# Patient Record
Sex: FEMALE | Race: WHITE | NOT HISPANIC OR LATINO | URBAN - METROPOLITAN AREA
[De-identification: names, ages, dates, MRNs, and addresses within clinical notes are randomized per-mention and may not be internally consistent; named-entity substitution may affect disease eponyms.]

---

## 2023-09-14 PROBLEM — H40.003 PREGLAUCOMA, UNSPECIFIED, BILATERAL: Status: ACTIVE | Noted: 2023-09-14

## 2023-09-14 PROBLEM — Z96.1 PRESENCE OF INTRAOCULAR LENS: Status: ACTIVE | Noted: 2023-09-14

## 2023-09-14 PROBLEM — H18.20 CORNEAL EDEMA: Status: ACTIVE | Noted: 2023-09-14

## 2023-09-14 PROBLEM — F41.9 ANXIETY: Status: ACTIVE | Noted: 2023-09-14

## 2023-09-14 PROBLEM — H25.10 NUCLEAR SENILE CATARACT: Status: ACTIVE | Noted: 2023-09-14

## 2023-09-14 PROBLEM — S76.011A STRAIN OF RIGHT HIP: Status: ACTIVE | Noted: 2023-09-14

## 2023-09-14 PROBLEM — H02.834 DERMATOCHALASIS OF LEFT UPPER EYELID: Status: ACTIVE | Noted: 2023-09-14

## 2023-09-14 PROBLEM — H52.7 UNSPECIFIED DISORDER OF REFRACTION: Status: ACTIVE | Noted: 2023-09-14

## 2023-09-14 PROBLEM — S86.019A: Status: ACTIVE | Noted: 2023-09-14

## 2023-09-14 PROBLEM — M79.606 PAIN OF LOWER EXTREMITY: Status: ACTIVE | Noted: 2023-09-14

## 2023-09-14 PROBLEM — H02.831 DERMATOCHALASIS OF RIGHT UPPER EYELID: Status: ACTIVE | Noted: 2023-09-14

## 2023-09-14 PROBLEM — H02.409 PTOSIS OF EYELID: Status: ACTIVE | Noted: 2023-09-14

## 2023-09-14 PROBLEM — H52.213 IRREGULAR ASTIGMATISM OF BOTH EYES: Status: ACTIVE | Noted: 2023-09-14

## 2023-09-14 RX ORDER — NAPROXEN SODIUM 220 MG/1
81 TABLET, FILM COATED ORAL DAILY
COMMUNITY
End: 2024-01-23 | Stop reason: WASHOUT

## 2024-01-10 ENCOUNTER — OFFICE VISIT (OUTPATIENT)
Dept: OPHTHALMOLOGY | Facility: CLINIC | Age: 79
End: 2024-01-10
Payer: COMMERCIAL

## 2024-01-10 DIAGNOSIS — H52.213 IRREGULAR ASTIGMATISM OF BOTH EYES: ICD-10-CM

## 2024-01-10 DIAGNOSIS — Z96.1 PRESENCE OF INTRAOCULAR LENS: ICD-10-CM

## 2024-01-10 DIAGNOSIS — H35.62 RETINAL HEMORRHAGE, LEFT EYE: Primary | ICD-10-CM

## 2024-01-10 PROBLEM — L60.8 SPLINTER HEMORRHAGE: Status: ACTIVE | Noted: 2024-01-10

## 2024-01-10 RX ORDER — DICYCLOMINE HYDROCHLORIDE 10 MG/1
10 CAPSULE ORAL AS NEEDED
COMMUNITY
Start: 2023-12-28

## 2024-01-10 ASSESSMENT — ENCOUNTER SYMPTOMS
LOSS OF SENSATION IN FEET: 0
DEPRESSION: 0
OCCASIONAL FEELINGS OF UNSTEADINESS: 0

## 2024-01-10 ASSESSMENT — REFRACTION_WEARINGRX
OS_ADD: 1.00
OD_ADD: 2.00
SPECS_TYPE: PAL
OD_SPHERE: +3.00
OS_AXIS: 176
OS_CYLINDER: -3.50
OD_CYLINDER: -3.25
OD_AXIS: 171
OS_SPHERE: +2.75

## 2024-01-10 ASSESSMENT — PATIENT HEALTH QUESTIONNAIRE - PHQ9
1. LITTLE INTEREST OR PLEASURE IN DOING THINGS: NOT AT ALL
SUM OF ALL RESPONSES TO PHQ9 QUESTIONS 1 AND 2: 0
2. FEELING DOWN, DEPRESSED OR HOPELESS: NOT AT ALL

## 2024-01-10 ASSESSMENT — VISUAL ACUITY
OS_CC: 20/40
OD_CC: 20/40
OS_CC+: -1
CORRECTION_TYPE: GLASSES
OD_CC+: +1
METHOD: SNELLEN - LINEAR

## 2024-01-10 ASSESSMENT — CUP TO DISC RATIO
OS_RATIO: 0.65
OD_RATIO: 0.55

## 2024-01-10 ASSESSMENT — TONOMETRY
IOP_METHOD: GOLDMANN APPLANATION
OD_IOP_MMHG: 08
OS_IOP_MMHG: 08

## 2024-01-10 ASSESSMENT — SLIT LAMP EXAM - LIDS
COMMENTS: 1+ BLEPHARITIS, 1+ DERMATOCHALASIS - UPPER LID
COMMENTS: 1+ BLEPHARITIS, 1+ DERMATOCHALASIS - UPPER LID

## 2024-01-10 ASSESSMENT — EXTERNAL EXAM - LEFT EYE: OS_EXAM: BROW PTOSIS

## 2024-01-10 ASSESSMENT — EXTERNAL EXAM - RIGHT EYE: OD_EXAM: BROW PTOSIS

## 2024-01-10 ASSESSMENT — PAIN SCALES - GENERAL: PAINLEVEL: 0-NO PAIN

## 2024-01-10 NOTE — PROGRESS NOTES
Subjective   Patient ID: Rajni Lala is a 78 y.o. female.    Chief Complaint    Follow-up       HPI    No visual acuity (VA) complaints   Last edited by SHANTEL Cooper on 1/10/2024  9:19 AM.        No current outpatient medications on file. (Ophthalmology pharm classes)       Current Outpatient Medications (Other)   Medication Sig Dispense Refill    aspirin 81 mg chewable tablet Chew 1 tablet (81 mg) once daily.      dicyclomine (Bentyl) 10 mg capsule Take 1 capsule (10 mg) by mouth once daily.      Lactobacillus acidophilus (PROBIOTIC ORAL) Take by mouth.         Objective   Base Eye Exam       Visual Acuity (Snellen - Linear)         Right Left Both    Dist cc 20/40 +1 20/40 -1 20/30    Dist ph cc NI NI     Near cc   J1+      Correction: Glasses              Tonometry (Goldmann Applanation, 9:26 AM)         Right Left    Pressure 08 08              Pupils         Dark Shape React APD    Right 4 Round Minimal None    Left 4 Round Minimal None              Extraocular Movement         Right Left     Full Full              Dilation       Both eyes: 1% Tropic 2.5% Phen @ 9:26 AM                  Slit Lamp and Fundus Exam       External Exam         Right Left    External Brow ptosis Brow ptosis              Slit Lamp Exam         Right Left    Lids/Lashes 1+ Blepharitis, 1+ Dermatochalasis - upper lid 1+ Blepharitis, 1+ Dermatochalasis - upper lid    Conjunctiva/Sclera normal bulbar and palepbral conjunctiva normal bulbar and palepbral conjunctiva    Cornea normal epi/stroma/endo and tear film normal epi/stroma/endo and tear film    Anterior Chamber ant. chamber deep and quiet ant. chamber deep and quiet    Iris iris normal iris normal    Lens PC IOL centered w/clear capsule PC IOL centered w/clear capsule    Anterior Vitreous Vitreous syneresis Vitreous syneresis              Fundus Exam         Right Left    Disc Normal Normal    C/D Ratio 0.55 0.65    Macula normal macula normal macula    Vessels normal  retinal vessels normal retinal vessels    Periphery normal retinal periphery normal retinal periphery; one splinter heme OS inferiorly, Laser scar                        Refraction       Wearing Rx         Sphere Cylinder Axis Add    Right +3.00 -3.25 171 2.00    Left +2.75 -3.50 176 1.00      Type: PAL                    Assessment/Plan   Problem List Items Addressed This Visit          Eye/Vision problems    Irregular astigmatism of both eyes    Presence of intraocular lens    Retinal hemorrhage, left eye - Primary

## 2024-01-23 ENCOUNTER — OFFICE VISIT (OUTPATIENT)
Dept: PRIMARY CARE | Facility: CLINIC | Age: 79
End: 2024-01-23
Payer: COMMERCIAL

## 2024-01-23 VITALS
DIASTOLIC BLOOD PRESSURE: 70 MMHG | BODY MASS INDEX: 24.66 KG/M2 | HEART RATE: 93 BPM | HEIGHT: 65 IN | WEIGHT: 148 LBS | SYSTOLIC BLOOD PRESSURE: 142 MMHG | TEMPERATURE: 98.3 F | OXYGEN SATURATION: 99 %

## 2024-01-23 DIAGNOSIS — R03.0 ELEVATED BLOOD PRESSURE READING IN OFFICE WITHOUT DIAGNOSIS OF HYPERTENSION: Primary | ICD-10-CM

## 2024-01-23 PROBLEM — Z98.41 HISTORY OF RIGHT CATARACT EXTRACTION: Status: ACTIVE | Noted: 2024-01-23

## 2024-01-23 PROBLEM — Z98.42 HISTORY OF LEFT CATARACT EXTRACTION: Status: ACTIVE | Noted: 2024-01-23

## 2024-01-23 PROCEDURE — 1159F MED LIST DOCD IN RCRD: CPT | Performed by: FAMILY MEDICINE

## 2024-01-23 PROCEDURE — 1036F TOBACCO NON-USER: CPT | Performed by: FAMILY MEDICINE

## 2024-01-23 PROCEDURE — 1126F AMNT PAIN NOTED NONE PRSNT: CPT | Performed by: FAMILY MEDICINE

## 2024-01-23 PROCEDURE — 99214 OFFICE O/P EST MOD 30 MIN: CPT | Performed by: FAMILY MEDICINE

## 2024-01-23 RX ORDER — LISINOPRIL 10 MG/1
10 TABLET ORAL DAILY
Qty: 30 TABLET | Refills: 1 | Status: SHIPPED | OUTPATIENT
Start: 2024-01-23 | End: 2024-02-05 | Stop reason: SDUPTHER

## 2024-01-23 RX ORDER — ASCORBIC ACID 500 MG
1000 TABLET ORAL DAILY
COMMUNITY

## 2024-01-23 ASSESSMENT — PAIN SCALES - GENERAL: PAINLEVEL: 0-NO PAIN

## 2024-01-23 NOTE — PROGRESS NOTES
"       Outpatient Visit Note    Chief Complaint   Patient presents with    Hypertension     Pt had a friend recently pass away and has been anxious.         HPI:  Rajni Lala is a 78 y.o. female   who presents to the office secondary to concerns of elevated blood pressure.  She was last seen in the office on 6/12/2023 as a new patient for an annual wellness exam.           Review of chart notes past medical history significant for glaucoma/cataracts to which he is actively established in following with Dr. Delgadillo of Ophthalmology. She underwent left cataract removal in May with right cataract removal performed on 06/08/2023.           Prior to last encounter, patient had just subsequently presented to the [facility] Emergency Department approximately 24 hours prior secondary to complaints of intermittent abdominal pain for the past several weeks.  Stated to be rather anxious about her discomfort and reiterated having recently had cataract surgery to which her stomach had been \"acting up\" since.  Had been actively working on things such in the yard when she first noticed the symptoms.  Continued to note discomfort in her left upper quadrant abdominal discomfort which radiated to her entire body at times of both activity and inactivity. Did confirm history of prior cholecystectomy. Physical exam noted mild tenderness in epigastric and left upper quadrant with no rigidity, palpable masses or distension.  ER vitals noted elevated blood pressure at 163/77. Workup was completed including EKG, CT abdomen pelvis, CBC, urinalysis, CMP and lipase. Blood work was unremarkable with urinalysis negative for UTI. CT abdomen/pelvis showed subtle findings that could suggest enteritis/colitis but node definitive acute process. She was treated with dose of IV Protonix with symptoms improving over time. Overall, etiology remained unclear with patient ultimately discharged home with prescription of Bentyl and recommendation for " formal outpatient Gastroenterology follow-up.  At time of appointment she stated to be doing generally well with no active abdominal pain complaints.  Confirmed active relationship with Dr. Kumar, who performed her colonoscopy in 2020/2021 with plans for specialist follow-up if symptoms continued.    Today she reports significant stressors over the last several weeks including extensive caretaking with her neighbor/sister and recent death of her friend.  Does state that anxiety level has been increased.  With this she has been monitoring her blood pressures noting fluctuating readings between 140s-160s/80s-100s.  Denies any chest pain, shortness of breath, lightheadedness or dizziness.  Does recognize that her stress has added to higher blood pressure readings.    Of note, she did follow-up with GI and was given prescription for Bentyl.  Has used the medication intermittently with occasional episodes of abdominal discomfort.    Current Medications  Current Outpatient Medications   Medication Instructions    ascorbic acid (VITAMIN C) 1,000 mg, oral, Daily    aspirin 81 mg, oral, Daily    B complex-vitamin C-folic acid (Nephro-[facility] Rx) 1- mg-mg-mcg tablet 1 tablet, oral, Daily with breakfast, BIOTIN<BR>    calcium carbonate/vitamin D3 (CALCIUM 500 + D, D3, ORAL) oral    dicyclomine (BENTYL) 10 mg, oral, As needed    Lactobacillus acidophilus (PROBIOTIC ORAL) oral    lisinopril 10 mg, oral, Daily    vit C/vit D3/E/zinc/elderberry (AIRBORNE VITS ZINC ELDERBERRY ORAL) oral        Allergies  Allergies   Allergen Reactions    Erythromycin Unknown    Penicillins Unknown    Tetracycline Unknown        Past Medical History:   Diagnosis Date    Cataract     Glaucoma       Past Surgical History:   Procedure Laterality Date    CATARACT EXTRACTION Left 06/2023     Family History   Problem Relation Name Age of Onset    Pancreatic cancer Mother      Heart disease Father       Social History     Tobacco Use    Smoking  status: Never    Smokeless tobacco: Never   Vaping Use    Vaping Use: Never used   Substance Use Topics    Alcohol use: Not Currently    Drug use: Never       ROS  All pertinent positive symptoms are included in the history of present illness.  All other systems have been reviewed and are negative and noncontributory to this patient's current ailments.    VITAL SIGNS  Vitals:    01/23/24 1021   BP: 142/70   Pulse: 93   Temp: 36.8 °C (98.3 °F)   SpO2: 99%       PHYSICAL EXAM  GENERAL APPEARANCE: alert and oriented, Pleasant and cooperative, No Acute Distress  HEENT: EOMI, PERRLA, MMM  HEART: RRR, normal S1S2, no murmurs, click or rubs  LUNGS: clear to auscultation bilaterally, no wheezes/rhonchi/rales  EXTREMITIES: no edema, normal ROM  SKIN: normal, no rash, unremarkable  NEUROLOGIC EXAM: non-focal exam  MUSCULOSKELETAL: no gross abnormalities  PSYCH: affect is normal, eye contact is good      Assessment/Plan   Problem List Items Addressed This Visit             ICD-10-CM    Elevated blood pressure reading in office without diagnosis of hypertension - Primary R03.0     - Elevated blood pressures most likely driven by underlying stress to which we will continue to conservative monitor along with continued healthy balanced diet and moderation of salt/caffeine/alcohol  -We did discuss potential trial of low-dose antihypertensive in the form of lisinopril 10 mg which I have printed a prescription.  If home blood pressure readings continue to be significantly elevated, can initiate trial with plans for follow-up in in 2 to 4 weeks to monitor effectiveness/tolerance  -Please do not hesitate to call office if there are any concerns or issues         Relevant Medications    lisinopril 10 mg tablet       Counseling:       Medication education:         Education:  The patient is counseled regarding potential side-effects of all new medications        Understanding:  Patient expressed understanding        Adherence:  No  barriers to adherence identified

## 2024-01-23 NOTE — PATIENT INSTRUCTIONS
Problem List Items Addressed This Visit             ICD-10-CM    Elevated blood pressure reading in office without diagnosis of hypertension - Primary R03.0     - Elevated blood pressures most likely driven by underlying stress to which we will continue to conservative monitor along with continued healthy balanced diet and moderation of salt/caffeine/alcohol  -We did discuss potential trial of low-dose antihypertensive in the form of lisinopril 10 mg which I have printed a prescription.  If home blood pressure readings continue to be significantly elevated, can initiate trial with plans for follow-up in in 2 to 4 weeks to monitor effectiveness/tolerance  -Please do not hesitate to call office if there are any concerns or issues         Relevant Medications    lisinopril 10 mg tablet       Counseling:       Medication education:         Education:  The patient is counseled regarding potential side-effects of all new medications        Understanding:  Patient expressed understanding        Adherence:  No barriers to adherence identified

## 2024-01-23 NOTE — ASSESSMENT & PLAN NOTE
- Elevated blood pressures most likely driven by underlying stress to which we will continue to conservative monitor along with continued healthy balanced diet and moderation of salt/caffeine/alcohol  -We did discuss potential trial of low-dose antihypertensive in the form of lisinopril 10 mg which I have printed a prescription.  If home blood pressure readings continue to be significantly elevated, can initiate trial with plans for follow-up in in 2 to 4 weeks to monitor effectiveness/tolerance  -Please do not hesitate to call office if there are any concerns or issues

## 2024-02-01 DIAGNOSIS — R03.0 ELEVATED BLOOD PRESSURE READING IN OFFICE WITHOUT DIAGNOSIS OF HYPERTENSION: ICD-10-CM

## 2024-02-06 RX ORDER — LISINOPRIL 10 MG/1
10 TABLET ORAL DAILY
Qty: 30 TABLET | Refills: 1 | Status: SHIPPED | OUTPATIENT
Start: 2024-02-06 | End: 2024-05-03

## 2024-04-15 ENCOUNTER — OFFICE VISIT (OUTPATIENT)
Dept: OPHTHALMOLOGY | Facility: CLINIC | Age: 79
End: 2024-04-15
Payer: COMMERCIAL

## 2024-04-15 DIAGNOSIS — H02.834 DERMATOCHALASIS OF BOTH UPPER EYELIDS: ICD-10-CM

## 2024-04-15 DIAGNOSIS — H52.213 IRREGULAR ASTIGMATISM OF BOTH EYES: ICD-10-CM

## 2024-04-15 DIAGNOSIS — H40.003 PREGLAUCOMA, UNSPECIFIED, BILATERAL: ICD-10-CM

## 2024-04-15 DIAGNOSIS — H02.403 PTOSIS OF BOTH EYELIDS: ICD-10-CM

## 2024-04-15 DIAGNOSIS — Z96.1 PRESENCE OF INTRAOCULAR LENS: Primary | ICD-10-CM

## 2024-04-15 DIAGNOSIS — H02.831 DERMATOCHALASIS OF BOTH UPPER EYELIDS: ICD-10-CM

## 2024-04-15 PROBLEM — Z98.42 HISTORY OF LEFT CATARACT EXTRACTION: Status: RESOLVED | Noted: 2024-01-23 | Resolved: 2024-04-15

## 2024-04-15 PROBLEM — Z98.41 HISTORY OF RIGHT CATARACT EXTRACTION: Status: RESOLVED | Noted: 2024-01-23 | Resolved: 2024-04-15

## 2024-04-15 PROCEDURE — 99214 OFFICE O/P EST MOD 30 MIN: CPT | Performed by: OPHTHALMOLOGY

## 2024-04-15 ASSESSMENT — REFRACTION_WEARINGRX
OD_ADD: 2.00
OD_CYLINDER: -3.25
SPECS_TYPE: PAL
OS_ADD: 1.00
OS_CYLINDER: -3.50
OD_SPHERE: +3.00
OD_AXIS: 171
OS_AXIS: 176
OS_SPHERE: +2.75

## 2024-04-15 ASSESSMENT — ENCOUNTER SYMPTOMS
CONSTITUTIONAL NEGATIVE: 0
HEMATOLOGIC/LYMPHATIC NEGATIVE: 0
LOSS OF SENSATION IN FEET: 0
CARDIOVASCULAR NEGATIVE: 0
EYES NEGATIVE: 0
PSYCHIATRIC NEGATIVE: 0
NEUROLOGICAL NEGATIVE: 0
ALLERGIC/IMMUNOLOGIC NEGATIVE: 0
ENDOCRINE NEGATIVE: 0
MUSCULOSKELETAL NEGATIVE: 0
OCCASIONAL FEELINGS OF UNSTEADINESS: 0
RESPIRATORY NEGATIVE: 0
GASTROINTESTINAL NEGATIVE: 0
DEPRESSION: 0

## 2024-04-15 ASSESSMENT — VISUAL ACUITY
OS_CC+: +2
METHOD: SNELLEN - LINEAR
OS_CC: 20/40
OD_CC: 20/30
OD_CC+: -2
CORRECTION_TYPE: GLASSES

## 2024-04-15 ASSESSMENT — PATIENT HEALTH QUESTIONNAIRE - PHQ9
1. LITTLE INTEREST OR PLEASURE IN DOING THINGS: NOT AT ALL
2. FEELING DOWN, DEPRESSED OR HOPELESS: NOT AT ALL
SUM OF ALL RESPONSES TO PHQ9 QUESTIONS 1 AND 2: 0

## 2024-04-15 ASSESSMENT — EXTERNAL EXAM - RIGHT EYE: OD_EXAM: BROW PTOSIS

## 2024-04-15 ASSESSMENT — TONOMETRY
OD_IOP_MMHG: 10
OS_IOP_MMHG: 10
IOP_METHOD: GOLDMANN APPLANATION

## 2024-04-15 ASSESSMENT — PAIN SCALES - GENERAL: PAINLEVEL: 0-NO PAIN

## 2024-04-15 ASSESSMENT — CUP TO DISC RATIO
OS_RATIO: 0.65
OD_RATIO: 0.55

## 2024-04-15 ASSESSMENT — EXTERNAL EXAM - LEFT EYE: OS_EXAM: BROW PTOSIS

## 2024-04-15 NOTE — PROGRESS NOTES
Subjective   Patient ID: Rajni Lala is a 78 y.o. female.    Chief Complaint    Follow-up       HPI    Dilated exam.  No new changes in health history.  Now on lisinopril.  Vision is stable.  No new complaints.  Not happy with cl's.    Last edited by Mustapha Delgadillo MD on 4/15/2024  3:51 PM.        No current outpatient medications on file. (Ophthalmology pharm classes)       Current Outpatient Medications (Other)   Medication Sig Dispense Refill    ascorbic acid (Vitamin C) 500 mg tablet Take 2 tablets (1,000 mg) by mouth once daily.      B complex-vitamin C-folic acid (Nephro-Allen Rx) 1- mg-mg-mcg tablet Take 1 tablet by mouth once daily with breakfast. BIOTIN      calcium carbonate/vitamin D3 (CALCIUM 500 + D, D3, ORAL) Take by mouth.      dicyclomine (Bentyl) 10 mg capsule Take 1 capsule (10 mg) by mouth if needed.      vit C/vit D3/E/zinc/elderberry (AIRBORNE VITS ZINC ELDERBERRY ORAL) Take by mouth.      lisinopril 10 mg tablet Take 1 tablet (10 mg) by mouth once daily. 30 tablet 1       Objective   Base Eye Exam       Visual Acuity (Snellen - Linear)         Right Left    Dist cc 20/30 -2 20/40 +2      Correction: Glasses              Tonometry (Goldmann Applanation, 2:54 PM)         Right Left    Pressure 10 10              Pupils         Dark Shape React APD    Right 4 Round Minimal None    Left 4 Round Minimal None              Extraocular Movement         Right Left     Full Full              Dilation       Both eyes: 1% Tropic 2.5% Phen @ 2:54 PM                  Slit Lamp and Fundus Exam       External Exam         Right Left    External Brow ptosis Brow ptosis              Slit Lamp Exam         Right Left    Lids/Lashes 1+ Blepharitis, 1+ Dermatochalasis - upper lid 1+ Blepharitis, 1+ Dermatochalasis - upper lid    Conjunctiva/Sclera normal bulbar and palepbral conjunctiva normal bulbar and palepbral conjunctiva    Cornea normal epi/stroma/endo and tear film normal epi/stroma/endo and  tear film    Anterior Chamber ant. chamber deep and quiet ant. chamber deep and quiet    Iris iris normal iris normal    Lens PC IOL centered w/clear capsule PC IOL centered w/clear capsule    Anterior Vitreous Vitreous syneresis Vitreous syneresis              Fundus Exam         Right Left    Disc Normal Normal    C/D Ratio 0.55 0.65    Macula normal macula normal macula    Vessels normal retinal vessels normal retinal vessels    Periphery normal retinal periphery normal retinal periphery; one splinter heme OS inferiorly, Laser scar                        Refraction       Wearing Rx         Sphere Cylinder Axis Add    Right +3.00 -3.25 171 2.00    Left +2.75 -3.50 176 1.00      Type: PAL                    Assessment/Plan   Problem List Items Addressed This Visit          Eye/Vision problems    Dermatochalasis of both upper eyelids    Irregular astigmatism of both eyes    Preglaucoma, unspecified, bilateral     F/u one year full with oct nerves and cl exam.           Presence of intraocular lens - Primary    Ptosis of eyelid

## 2024-04-27 DIAGNOSIS — R03.0 ELEVATED BLOOD PRESSURE READING IN OFFICE WITHOUT DIAGNOSIS OF HYPERTENSION: ICD-10-CM

## 2024-05-03 RX ORDER — LISINOPRIL 10 MG/1
10 TABLET ORAL DAILY
Qty: 90 TABLET | Refills: 1 | Status: SHIPPED | OUTPATIENT
Start: 2024-05-03 | End: 2024-05-30 | Stop reason: SDUPTHER

## 2024-05-28 PROBLEM — M79.606 PAIN OF LOWER EXTREMITY: Status: RESOLVED | Noted: 2023-09-14 | Resolved: 2024-05-28

## 2024-05-28 PROBLEM — S76.011A STRAIN OF RIGHT HIP: Status: RESOLVED | Noted: 2023-09-14 | Resolved: 2024-05-28

## 2024-05-28 NOTE — PROGRESS NOTES
Outpatient Visit Note    Chief Complaint   Patient presents with    Earache     Right ear ache x 3 weeks. C/O Stuffiness, neck pain, HA, pressure          HPI:  Rajni Lala is a 78 y.o. female who presents to the office secondary to right ear pain.  She was last seen in the office on 1/23/2024 for concerns of elevated blood pressure, having initially been seen on 6/12/2023 as a new patient for an annual wellness exam.           Review of chart notes past medical history significant for glaucoma/cataracts to which he is actively established in following with Dr. Delgadillo of Ophthalmology. She underwent left cataract removal in May with right cataract removal performed on 06/08/2023.           Today she reports approximately 3 weeks of intermittent right-sided ear discomfort.  States to have history of sensitivity with her ears which seems to have been exacerbated following recent weather patterns.  Has noted associated postnasal drainage with sinus pressure, occipital/neck pain and nasal congestion.  Denies taking anything for symptom relief.  Does not traditionally have history of sinus infections or seasonal allergies.  No reports of fever, chills or other systemic complaints.  Denies any recent sick contacts    Current Medications  Current Outpatient Medications   Medication Instructions    ascorbic acid (VITAMIN C) 1,000 mg, oral, Daily    B complex-vitamin C-folic acid (Nephro-Allen Rx) 1- mg-mg-mcg tablet 1 tablet, oral, Daily with breakfast, BIOTIN<BR>    calcium carbonate/vitamin D3 (CALCIUM 500 + D, D3, ORAL) oral    clindamycin (CLEOCIN) 300 mg, oral, 3 times daily    dicyclomine (BENTYL) 10 mg, oral, As needed    fluticasone (Flonase) 50 mcg/actuation nasal spray 1 spray, Each Nostril, Daily, Shake gently. Before first use, prime pump. After use, clean tip and replace cap.    lisinopril 10 mg, oral, Daily        Allergies  Allergies   Allergen Reactions    Erythromycin Unknown     Penicillins Unknown    Tetracycline Unknown        Past Medical History:   Diagnosis Date    Cataract     Glaucoma       Past Surgical History:   Procedure Laterality Date    CATARACT EXTRACTION Left 06/2023    CHOLECYSTECTOMY       Family History   Problem Relation Name Age of Onset    Pancreatic cancer Mother      Heart disease Father Ric      Social History     Tobacco Use    Smoking status: Never     Passive exposure: Never    Smokeless tobacco: Never   Vaping Use    Vaping status: Never Used   Substance Use Topics    Alcohol use: Not Currently     Alcohol/week: 2.0 standard drinks of alcohol     Types: 1 Glasses of wine, 1 Shots of liquor per week    Drug use: Never       ROS  All pertinent positive symptoms are included in the history of present illness.  All other systems have been reviewed and are negative and noncontributory to this patient's current ailments.    VITAL SIGNS  Vitals:    05/30/24 1058   BP: 130/70   Pulse: 67   Resp: 18   Temp: 37 °C (98.6 °F)   SpO2: 96%         PHYSICAL EXAM  GENERAL APPEARANCE: alert and oriented, Pleasant and cooperative, No Acute Distress  HEENT: EOMI, PERRLA, mildly bulging TM with visible really exudate on right, no external auditory canal erythema, tympanic membranes clear and flat on left, nose clear, Oropharynx clear with MMM  EXTREMITIES: no edema, normal ROM  SKIN: normal, no rash, unremarkable  NEUROLOGIC EXAM: non-focal exam  MUSCULOSKELETAL: no gross abnormalities  PSYCH: affect is normal, eye contact is good      Assessment/Plan   Problem List Items Addressed This Visit             ICD-10-CM    Acute otitis media - Primary H66.90     - Symptoms and physical appear consistent with infection to which we will initiate clindamycin and attempts to avoid sensitivity to other medications  -Supportive care encouraged including good hydration and ibuprofen/Tylenol as needed for pain  -We will additionally add Flonase nasal spray to help open up eustachian  tube/sinus passages  -Please contact office if symptoms persist/progress or if there are any trouble with the medication         Relevant Medications    clindamycin (Cleocin) 300 mg capsule    fluticasone (Flonase) 50 mcg/actuation nasal spray         Counseling:       Medication education:         Education:  The patient is counseled regarding potential side-effects of all new medications        Understanding:  Patient expressed understanding        Adherence:  No barriers to adherence identified

## 2024-05-30 ENCOUNTER — OFFICE VISIT (OUTPATIENT)
Dept: PRIMARY CARE | Facility: CLINIC | Age: 79
End: 2024-05-30
Payer: COMMERCIAL

## 2024-05-30 VITALS
HEIGHT: 65 IN | RESPIRATION RATE: 18 BRPM | DIASTOLIC BLOOD PRESSURE: 70 MMHG | SYSTOLIC BLOOD PRESSURE: 130 MMHG | OXYGEN SATURATION: 96 % | WEIGHT: 148.6 LBS | BODY MASS INDEX: 24.76 KG/M2 | HEART RATE: 67 BPM | TEMPERATURE: 98.6 F

## 2024-05-30 DIAGNOSIS — H66.90 ACUTE OTITIS MEDIA, UNSPECIFIED OTITIS MEDIA TYPE: Primary | ICD-10-CM

## 2024-05-30 DIAGNOSIS — R03.0 ELEVATED BLOOD PRESSURE READING IN OFFICE WITHOUT DIAGNOSIS OF HYPERTENSION: ICD-10-CM

## 2024-05-30 PROCEDURE — 1160F RVW MEDS BY RX/DR IN RCRD: CPT | Performed by: FAMILY MEDICINE

## 2024-05-30 PROCEDURE — 1125F AMNT PAIN NOTED PAIN PRSNT: CPT | Performed by: FAMILY MEDICINE

## 2024-05-30 PROCEDURE — 1036F TOBACCO NON-USER: CPT | Performed by: FAMILY MEDICINE

## 2024-05-30 PROCEDURE — 99214 OFFICE O/P EST MOD 30 MIN: CPT | Performed by: FAMILY MEDICINE

## 2024-05-30 PROCEDURE — 1159F MED LIST DOCD IN RCRD: CPT | Performed by: FAMILY MEDICINE

## 2024-05-30 PROCEDURE — 1158F ADVNC CARE PLAN TLK DOCD: CPT | Performed by: FAMILY MEDICINE

## 2024-05-30 PROCEDURE — 1123F ACP DISCUSS/DSCN MKR DOCD: CPT | Performed by: FAMILY MEDICINE

## 2024-05-30 RX ORDER — FLUTICASONE PROPIONATE 50 MCG
1 SPRAY, SUSPENSION (ML) NASAL DAILY
Qty: 16 G | Refills: 1 | Status: SHIPPED | OUTPATIENT
Start: 2024-05-30 | End: 2025-05-30

## 2024-05-30 RX ORDER — CLINDAMYCIN HYDROCHLORIDE 300 MG/1
300 CAPSULE ORAL 3 TIMES DAILY
Qty: 21 CAPSULE | Refills: 0 | Status: SHIPPED | OUTPATIENT
Start: 2024-05-30 | End: 2024-06-06

## 2024-05-30 RX ORDER — LISINOPRIL 10 MG/1
10 TABLET ORAL DAILY
Qty: 90 TABLET | Refills: 1 | Status: SHIPPED | OUTPATIENT
Start: 2024-05-30 | End: 2024-11-26

## 2024-05-30 ASSESSMENT — LIFESTYLE VARIABLES
HOW OFTEN DO YOU HAVE A DRINK CONTAINING ALCOHOL: 2-4 TIMES A MONTH
HOW MANY STANDARD DRINKS CONTAINING ALCOHOL DO YOU HAVE ON A TYPICAL DAY: 1 OR 2
SKIP TO QUESTIONS 9-10: 1
HOW OFTEN DO YOU HAVE SIX OR MORE DRINKS ON ONE OCCASION: NEVER
AUDIT-C TOTAL SCORE: 2

## 2024-05-30 ASSESSMENT — PAIN SCALES - GENERAL: PAINLEVEL: 3

## 2024-05-30 NOTE — PATIENT INSTRUCTIONS
Problem List Items Addressed This Visit             ICD-10-CM    Acute otitis media - Primary H66.90     - Symptoms and physical appear consistent with infection to which we will initiate clindamycin and attempts to avoid sensitivity to other medications  -Supportive care encouraged including good hydration and ibuprofen/Tylenol as needed for pain  -We will additionally add Flonase nasal spray to help open up eustachian tube/sinus passages  -Please contact office if symptoms persist/progress or if there are any trouble with the medication         Relevant Medications    clindamycin (Cleocin) 300 mg capsule    fluticasone (Flonase) 50 mcg/actuation nasal spray         Counseling:       Medication education:         Education:  The patient is counseled regarding potential side-effects of all new medications        Understanding:  Patient expressed understanding        Adherence:  No barriers to adherence identified

## 2024-05-30 NOTE — ASSESSMENT & PLAN NOTE
- Symptoms and physical appear consistent with infection to which we will initiate clindamycin and attempts to avoid sensitivity to other medications  -Supportive care encouraged including good hydration and ibuprofen/Tylenol as needed for pain  -We will additionally add Flonase nasal spray to help open up eustachian tube/sinus passages  -Please contact office if symptoms persist/progress or if there are any trouble with the medication

## 2024-06-10 ENCOUNTER — E-VISIT (OUTPATIENT)
Dept: PRIMARY CARE | Facility: CLINIC | Age: 79
End: 2024-06-10
Payer: COMMERCIAL

## 2024-06-10 ENCOUNTER — TELEPHONE (OUTPATIENT)
Dept: PRIMARY CARE | Facility: CLINIC | Age: 79
End: 2024-06-10
Payer: COMMERCIAL

## 2024-06-10 DIAGNOSIS — H66.90 ACUTE OTITIS MEDIA, UNSPECIFIED OTITIS MEDIA TYPE: Primary | ICD-10-CM

## 2024-06-10 RX ORDER — METHYLPREDNISOLONE 4 MG/1
TABLET ORAL
Qty: 21 TABLET | Refills: 0 | Status: SHIPPED | OUTPATIENT
Start: 2024-06-10 | End: 2024-06-17

## 2024-06-10 NOTE — TELEPHONE ENCOUNTER
Patient calling because patient finished taking clindamycin on Thursday and her ear still isn't feeling right.  Please call & advise.  Thank you

## 2024-06-11 NOTE — TELEPHONE ENCOUNTER
Received message from patient yesterday via [employer] to which I did send steroid back to initiate.  Please refer to other message

## 2024-06-28 ENCOUNTER — CLINICAL SUPPORT (OUTPATIENT)
Dept: OPHTHALMOLOGY | Facility: CLINIC | Age: 79
End: 2024-06-28
Payer: COMMERCIAL

## 2024-06-28 ASSESSMENT — REFRACTION_CURRENTRX
OS_SPHERE: +2.00
OS_CYLINDER: -3.75
OD_BRAND: PROCLEAR TORIC XR
OD_DIAMETER: 14.4
OD_SPHERE: +5.75
OD_AXIS: 180
OS_BASECURVE: 8.4
OS_AXIS: 180
OS_BRAND: PROCLEAR TORIC XR
OS_DIAMETER: 14.4
OD_BASECURVE: 8.4
OD_CYLINDER: -3.75

## 2024-08-29 DIAGNOSIS — R03.0 ELEVATED BLOOD PRESSURE READING IN OFFICE WITHOUT DIAGNOSIS OF HYPERTENSION: ICD-10-CM

## 2024-08-29 RX ORDER — LISINOPRIL 10 MG/1
10 TABLET ORAL DAILY
Qty: 90 TABLET | Refills: 1 | Status: SHIPPED | OUTPATIENT
Start: 2024-08-29

## 2024-09-22 DIAGNOSIS — H66.90 ACUTE OTITIS MEDIA, UNSPECIFIED OTITIS MEDIA TYPE: ICD-10-CM

## 2024-09-23 RX ORDER — FLUTICASONE PROPIONATE 50 MCG
SPRAY, SUSPENSION (ML) NASAL
Qty: 16 G | Refills: 5 | Status: SHIPPED | OUTPATIENT
Start: 2024-09-23

## 2024-11-22 ENCOUNTER — PATIENT MESSAGE (OUTPATIENT)
Dept: PRIMARY CARE | Facility: CLINIC | Age: 79
End: 2024-11-22
Payer: COMMERCIAL

## 2024-11-22 DIAGNOSIS — R03.0 ELEVATED BLOOD PRESSURE READING IN OFFICE WITHOUT DIAGNOSIS OF HYPERTENSION: ICD-10-CM

## 2024-11-22 RX ORDER — LISINOPRIL 10 MG/1
10 TABLET ORAL DAILY
Qty: 90 TABLET | Refills: 1 | Status: SHIPPED | OUTPATIENT
Start: 2024-11-22

## 2024-12-04 ENCOUNTER — OFFICE VISIT (OUTPATIENT)
Dept: PRIMARY CARE | Facility: CLINIC | Age: 79
End: 2024-12-04
Payer: COMMERCIAL

## 2024-12-04 VITALS
SYSTOLIC BLOOD PRESSURE: 142 MMHG | WEIGHT: 145 LBS | BODY MASS INDEX: 24.16 KG/M2 | HEIGHT: 65 IN | DIASTOLIC BLOOD PRESSURE: 80 MMHG | HEART RATE: 102 BPM | TEMPERATURE: 97.5 F | OXYGEN SATURATION: 98 %

## 2024-12-04 DIAGNOSIS — Z00.00 ROUTINE ADULT HEALTH MAINTENANCE: Primary | ICD-10-CM

## 2024-12-04 DIAGNOSIS — I10 PRIMARY HYPERTENSION: ICD-10-CM

## 2024-12-04 PROBLEM — S86.019A: Status: RESOLVED | Noted: 2023-09-14 | Resolved: 2024-12-04

## 2024-12-04 PROBLEM — H40.003 PREGLAUCOMA, UNSPECIFIED, BILATERAL: Status: RESOLVED | Noted: 2023-09-14 | Resolved: 2024-12-04

## 2024-12-04 PROCEDURE — 3077F SYST BP >= 140 MM HG: CPT | Performed by: FAMILY MEDICINE

## 2024-12-04 PROCEDURE — 3079F DIAST BP 80-89 MM HG: CPT | Performed by: FAMILY MEDICINE

## 2024-12-04 PROCEDURE — 1159F MED LIST DOCD IN RCRD: CPT | Performed by: FAMILY MEDICINE

## 2024-12-04 PROCEDURE — 1160F RVW MEDS BY RX/DR IN RCRD: CPT | Performed by: FAMILY MEDICINE

## 2024-12-04 PROCEDURE — 1036F TOBACCO NON-USER: CPT | Performed by: FAMILY MEDICINE

## 2024-12-04 PROCEDURE — 1158F ADVNC CARE PLAN TLK DOCD: CPT | Performed by: FAMILY MEDICINE

## 2024-12-04 PROCEDURE — G0439 PPPS, SUBSEQ VISIT: HCPCS | Performed by: FAMILY MEDICINE

## 2024-12-04 PROCEDURE — 1126F AMNT PAIN NOTED NONE PRSNT: CPT | Performed by: FAMILY MEDICINE

## 2024-12-04 PROCEDURE — 1123F ACP DISCUSS/DSCN MKR DOCD: CPT | Performed by: FAMILY MEDICINE

## 2024-12-04 RX ORDER — VIT C/E/ZN/COPPR/LUTEIN/ZEAXAN 250MG-90MG
500 CAPSULE ORAL DAILY
COMMUNITY

## 2024-12-04 ASSESSMENT — PATIENT HEALTH QUESTIONNAIRE - PHQ9
SUM OF ALL RESPONSES TO PHQ9 QUESTIONS 1 AND 2: 0
2. FEELING DOWN, DEPRESSED OR HOPELESS: NOT AT ALL
1. LITTLE INTEREST OR PLEASURE IN DOING THINGS: NOT AT ALL

## 2024-12-04 ASSESSMENT — PAIN SCALES - GENERAL: PAINLEVEL_OUTOF10: 0-NO PAIN

## 2024-12-04 NOTE — ASSESSMENT & PLAN NOTE
- Pressures have remained stable on lisinopril 10 mg to which we will continue without modification  -Continue to focus on healthy, balanced diet with moderation of salt/caffeine

## 2024-12-04 NOTE — PROGRESS NOTES
Outpatient Visit Note    Chief Complaint   Patient presents with    Annual Exam       HPI:  Rajni Lala is a 79 y.o. female with a past medical history significant for glaucoma/cataracts to which he is actively established in following with Dr. Delgadillo of Ophthalmologywho presents to the office for an annual Medicare Wellness Visit.    She was last seen in the office in May 2024 secondary to right ear pain.  Prior to this she had been seen in the office on 1/23/2024 for concerns of elevated blood pressure secondary to recent stressors, to which she was started on lisinopril 10 mg.           Last panel blood work completed in June 2023 including CBC, CMP and lipase as performed via emergency department secondary to abdominal pain concerns.  Patient's blood work was generally unremarkable outside of trace hyperglycemia though sample was nonfasting.  Lipid panel was additionally completed which was within normal range.    Well Exam:  Overall, they describes their health as good with no reports of recent illness or hospitalization. No reported issues of chest pain, shortness of breath, headaches, vision/hearing changes, abdominal pain, vomiting, diarrhea, melena, hematochezia, constipation or urinary symptoms.    Preventative Health Maintenance:  In regards to preventative health maintenance, last Tdap received unknown. Flu shot not typically received. Pneumonia vaccination series not previously initiated per state records and historically declined. Shingles vaccination series not pursued to date. COVID-19 vaccination series completed with patient currently due for booster. Mammograms not traditionally pursued per patient's wishes. DEXA scan successfully completed in June 2023.    Has been compliant with lisinopril which has been generally tolerated outside of occasional nonproductive cough and slight nausea.  Has noted blood pressures to continue to fluctuate, mainly related to stress.  She has been  actively taking care of her sisters who have been dealing with prominent health issues.  Notes that this also causes aggravation to her irritable bowel symptoms.  In general she feels that she keeps the symptoms and check.  She does check blood pressures at home, regularly seeing readings with systolics in the 120s-130s.  Feels generally comfortable on current regimen with interest on continuing without modification    Advanced directives: Living will/POA    Hearing screen: reports no difficulty with hearing and passes finger rub test bilaterally    Does the patient use opioid medications: No  Name of medication: N/A  If yes, do they take this medicine appropriately: N/A    How does the patient rate their health status today: Good    Cognitive Screen:  AAAx3  to person, place and time: Yes  3 word recall: Apple, Car, Shoe - Immediate recall: Yes        - 5 minutes recall: Yes  Impression: No cognitive deficiency observed during screening or encounter today      Reviewed:   Past Medical History/Allergies:  Yes  Family History:  Yes  Social History:  Yes  Current Medications:  Yes  Vital Signs:  Yes  Advanced Directives:  discussed  Immunizations:  reviewed today  Home Safety:                    Up & Go test > 30 seconds?  No                   Home have rugs; lack grab bars in bathroom; lack handrail on stairs; have poor lighting?  No                   Hearing difficulties?  No  Geriatric Assessment                   ADL areas requiring assistance:  Does not need help with Dressing, Eating, Ambulating, Toileting, Grooming, Hygiene.                    IADL areas requiring assistance:  Does not need help with Shopping, Housework, Accounting, Transportation, Driving.   Medications: reviewed  Current supplements:  Reviewed and recorded.   Other providers: Reviewed and recorded - Current providers and suppliers: Dr. Jefferson; Dr. Delgadillo -ophthalmology      Past Medical History:   Diagnosis Date    Cataract     Glaucoma          Current Medications  Current Outpatient Medications   Medication Instructions    ascorbic acid (VITAMIN C) 1,000 mg, Daily    B complex-vitamin C-folic acid (Nephro-[facility] Rx) 1- mg-mg-mcg tablet 1 tablet, Daily with breakfast    calcium carbonate/vitamin D3 (CALCIUM 500 + D, D3, ORAL) Take by mouth.    cyanocobalamin (VITAMIN B-12) 500 mcg, Daily    dicyclomine (BENTYL) 10 mg, As needed    fluticasone (Flonase) 50 mcg/actuation nasal spray USE 1 SPRAY IN EACH NOSTRIL ONCE DAILY. SHAKE GENTLY. BEFORE FIRST USE, PRIME PUMP. AFTER USE, CLEAN TIP AND REPLACE*CAP    lisinopril 10 mg, oral, Daily        Allergies  Allergies   Allergen Reactions    Erythromycin Unknown    Penicillins Unknown    Tetracycline Unknown        Immunizations  Immunization History   Administered Date(s) Administered    Pfizer COVID-19 vaccine, 12 years and older, (30mcg/0.3mL) (Comirnaty) 10/06/2023    Pfizer COVID-19 vaccine, bivalent, age 12 years and older (30 mcg/0.3 mL) 09/20/2022    Pfizer Gray Cap SARS-CoV-2 04/19/2022    Pfizer Purple Cap SARS-CoV-2 02/05/2021, 03/03/2021, 10/01/2021        Past Surgical History:   Procedure Laterality Date    CATARACT EXTRACTION Left 06/2023    CHOLECYSTECTOMY       Family History   Problem Relation Name Age of Onset    Pancreatic cancer Mother      Heart disease Father Ric      Social History     Tobacco Use    Smoking status: Never     Passive exposure: Never    Smokeless tobacco: Never   Vaping Use    Vaping status: Never Used   Substance Use Topics    Alcohol use: Yes     Alcohol/week: 2.0 standard drinks of alcohol     Types: 1 Glasses of wine, 1 Shots of liquor per week    Drug use: Never     Tobacco Use: Low Risk  (12/4/2024)    Patient History     Smoking Tobacco Use: Never     Smokeless Tobacco Use: Never     Passive Exposure: Never        ROS  All pertinent positive symptoms are included in the history of present illness.  All other systems have been reviewed and are negative and  noncontributory to this patient's current ailments.    VITAL SIGNS  Vitals:    12/04/24 0945   BP: 142/80   Pulse: 102   Temp: 36.4 °C (97.5 °F)   SpO2: 98%     Vitals:    12/04/24 0945   Weight: 65.8 kg (145 lb)      Body mass index is 24.13 kg/m².     PHYSICAL EXAM  GENERAL APPEARANCE: well nourished, well developed, looks like stated age, in no acute distress, not ill or tired appearing, conversing well.   HEENT: no trauma, normocephalic. PERRLA and EOMI with normal external exam. TM's intact with no injection or effusion, no signs of infection. Nares patent, turbinates pink without discharge. Pharynx pink with no exudates or lesions, no enlarged tonsils.   NECK: no nodes, supple without rigidity, no neck mass was observed, no thyromegaly or thyroid nodules.   HEART: regular rate and rhythm, S1 and S2 heard with no murmurs or skipped beats  LUNGS: clear to auscultation bilaterally with no wheezes, crackles or rales.   ABDOMEN: no organomegaly, soft, nontender, nondistended, no guarding/rebound/rigidity.   EXTREMITIES: moving all extremities equally with no edema or deformities.   SKIN: normal skin color and pigmentation, normal skin turgor without rash, lesions, or nodules visualized.   NEUROLOGIC EXAM: CN II-XII grossly intact, normal gait, normal balance, 5/5 muscle strength, sensation grossly intact.   PSYCH: mood and affect appropriate; alert and oriented to time, place, person; no difficulty with speech or language.     Preventative Services reviewed with patient and copy printed for patient.    Assessment/Plan   Problem List Items Addressed This Visit             ICD-10-CM    Hypertension I10     - Pressures have remained stable on lisinopril 10 mg to which we will continue without modification  -Continue to focus on healthy, balanced diet with moderation of salt/caffeine         Relevant Orders    TSH with reflex to Free T4 if abnormal    Lipid Panel    Comprehensive Metabolic Panel    CBC     Other Visit  Diagnoses         Codes    Routine adult health maintenance    -  Primary Z00.00    Relevant Orders    TSH with reflex to Free T4 if abnormal    Lipid Panel    Comprehensive Metabolic Panel    CBC            Additional Visit Plans:  Notes:  PREVENTATIVE HEALTH SCREENINGS INCLUDED:  - Blood pressure screen.  - Blood work may include a cholesterol and diabetes screen if risk factors exist (overweight, high blood pressure etc); screening for sexually transmitted infections; a one time HIV screen for all individuals, and a one time Hepatitis C Virus screen for those born between 7784-2702.  - I encourage you to eat a low-fat, moderate-carbohydrate, low-calorie diet to maintain a normal BMI (under 25) to reduce heart disease, and risk for diabetes  - Moderate intensity exercise for 30 minutes 5 days per week is recommended  - Along with recommendations for nutrition and exercise discussed today helpful resources recommended by the American Academy of Family Practice can be found at www.familydoctor.org or www.choosemyplate.gov    - Colon cancer screening for all ages 45-75 or 85 years old periodically depending on results.  - Cervical cancer screening (pap test) in women between 21-65 years old, periodically depending on results.  - Mammogram screening for breast cancer in women starting at 40-50 years and every 1-2 years.  - For men and women who have a 30 pack year smoking history and currently smoke or have quit in the past 15 years, screening for lung cancer with a yearly low dose CT scan is recommended starting at age 55 until age 80 years  - For men only who have smoked 100+ cigarettes anytime in their lifetime, a one time ultrasound screening for for abdominal aortic aneurysms starting at age 65 until 75 years old  - Bone density screening (DEXA) for osteoporosis in women aged 65 years and older, once every two years if needed.    IMMUNIZATIONS:  - Flu shot annually.  - Tetanus booster every 10 years.  - Two  pneumococcal vaccinations after 65 years old.  - Shingles vaccine for those 50 years or older.     This was a shared decision making visit.    Next Wellness Exam Due  In 1 year from today    Counseling:       Medication education:         Education:  The patient is counseled regarding potential side-effects of all new medications        Understanding:  Patient expressed understanding        Adherence:  No barriers to adherence identified    ** Please excuse any errors in grammar or translation related to this dictation. Voice recognition software was utilized to prepare this document. **

## 2024-12-04 NOTE — PATIENT INSTRUCTIONS
Problem List Items Addressed This Visit             ICD-10-CM    Hypertension I10     - Pressures have remained stable on lisinopril 10 mg to which we will continue without modification  -Continue to focus on healthy, balanced diet with moderation of salt/caffeine         Relevant Orders    TSH with reflex to Free T4 if abnormal    Lipid Panel    Comprehensive Metabolic Panel    CBC     Other Visit Diagnoses         Codes    Routine adult health maintenance    -  Primary Z00.00    Relevant Orders    TSH with reflex to Free T4 if abnormal    Lipid Panel    Comprehensive Metabolic Panel    CBC            Additional Visit Plans:  Notes:  PREVENTATIVE HEALTH SCREENINGS INCLUDED:  - Blood pressure screen.  - Blood work may include a cholesterol and diabetes screen if risk factors exist (overweight, high blood pressure etc); screening for sexually transmitted infections; a one time HIV screen for all individuals, and a one time Hepatitis C Virus screen for those born between 0251-2637.  - I encourage you to eat a low-fat, moderate-carbohydrate, low-calorie diet to maintain a normal BMI (under 25) to reduce heart disease, and risk for diabetes  - Moderate intensity exercise for 30 minutes 5 days per week is recommended  - Along with recommendations for nutrition and exercise discussed today helpful resources recommended by the American Academy of Family Practice can be found at www.familydoctor.org or www.choosemyplate.gov    - Colon cancer screening for all ages 45-75 or 85 years old periodically depending on results.  - Cervical cancer screening (pap test) in women between 21-65 years old, periodically depending on results.  - Mammogram screening for breast cancer in women starting at 40-50 years and every 1-2 years.  - For men and women who have a 30 pack year smoking history and currently smoke or have quit in the past 15 years, screening for lung cancer with a yearly low dose CT scan is recommended starting at age 55  until age 80 years  - For men only who have smoked 100+ cigarettes anytime in their lifetime, a one time ultrasound screening for for abdominal aortic aneurysms starting at age 65 until 75 years old  - Bone density screening (DEXA) for osteoporosis in women aged 65 years and older, once every two years if needed.    IMMUNIZATIONS:  - Flu shot annually.  - Tetanus booster every 10 years.  - Two pneumococcal vaccinations after 65 years old.  - Shingles vaccine for those 50 years or older.     This was a shared decision making visit.    Next Wellness Exam Due  In 1 year from today    Counseling:       Medication education:         Education:  The patient is counseled regarding potential side-effects of all new medications        Understanding:  Patient expressed understanding        Adherence:  No barriers to adherence identified    ** Please excuse any errors in grammar or translation related to this dictation. Voice recognition software was utilized to prepare this document. **

## 2024-12-09 ENCOUNTER — LAB (OUTPATIENT)
Dept: LAB | Facility: LAB | Age: 79
End: 2024-12-09
Payer: COMMERCIAL

## 2024-12-09 ENCOUNTER — PATIENT MESSAGE (OUTPATIENT)
Dept: PRIMARY CARE | Facility: CLINIC | Age: 79
End: 2024-12-09

## 2024-12-09 DIAGNOSIS — Z00.00 ROUTINE ADULT HEALTH MAINTENANCE: ICD-10-CM

## 2024-12-09 DIAGNOSIS — I10 PRIMARY HYPERTENSION: ICD-10-CM

## 2024-12-09 LAB
ALBUMIN SERPL BCP-MCNC: 4.2 G/DL (ref 3.4–5)
ALP SERPL-CCNC: 50 U/L (ref 33–136)
ALT SERPL W P-5'-P-CCNC: 15 U/L (ref 7–45)
ANION GAP SERPL CALCULATED.3IONS-SCNC: 14 MMOL/L (ref 10–20)
AST SERPL W P-5'-P-CCNC: 20 U/L (ref 9–39)
BILIRUB SERPL-MCNC: 1.1 MG/DL (ref 0–1.2)
BUN SERPL-MCNC: 16 MG/DL (ref 6–23)
CALCIUM SERPL-MCNC: 9.1 MG/DL (ref 8.6–10.3)
CHLORIDE SERPL-SCNC: 105 MMOL/L (ref 98–107)
CHOLEST SERPL-MCNC: 203 MG/DL (ref 0–199)
CHOLEST/HDLC SERPL: 3.6 {RATIO}
CO2 SERPL-SCNC: 26 MMOL/L (ref 21–32)
CREAT SERPL-MCNC: 0.83 MG/DL (ref 0.5–1.05)
EGFRCR SERPLBLD CKD-EPI 2021: 72 ML/MIN/1.73M*2
ERYTHROCYTE [DISTWIDTH] IN BLOOD BY AUTOMATED COUNT: 12.3 % (ref 11.5–14.5)
GLUCOSE SERPL-MCNC: 83 MG/DL (ref 74–99)
HCT VFR BLD AUTO: 39.7 % (ref 36–46)
HDLC SERPL-MCNC: 56.8 MG/DL
HGB BLD-MCNC: 13.4 G/DL (ref 12–16)
LDLC SERPL CALC-MCNC: 130 MG/DL
MCH RBC QN AUTO: 32.1 PG (ref 26–34)
MCHC RBC AUTO-ENTMCNC: 33.8 G/DL (ref 32–36)
MCV RBC AUTO: 95 FL (ref 80–100)
NON HDL CHOLESTEROL: 146 MG/DL (ref 0–149)
NRBC BLD-RTO: 0 /100 WBCS (ref 0–0)
PLATELET # BLD AUTO: 357 X10*3/UL (ref 150–450)
POTASSIUM SERPL-SCNC: 4.5 MMOL/L (ref 3.5–5.3)
PROT SERPL-MCNC: 6.4 G/DL (ref 6.4–8.2)
RBC # BLD AUTO: 4.17 X10*6/UL (ref 4–5.2)
SODIUM SERPL-SCNC: 140 MMOL/L (ref 136–145)
TRIGL SERPL-MCNC: 80 MG/DL (ref 0–149)
TSH SERPL-ACNC: 1.28 MIU/L (ref 0.44–3.98)
VLDL: 16 MG/DL (ref 0–40)
WBC # BLD AUTO: 7.4 X10*3/UL (ref 4.4–11.3)

## 2024-12-09 PROCEDURE — 80053 COMPREHEN METABOLIC PANEL: CPT

## 2024-12-09 PROCEDURE — 85027 COMPLETE CBC AUTOMATED: CPT

## 2024-12-09 PROCEDURE — 80061 LIPID PANEL: CPT

## 2024-12-09 PROCEDURE — 36415 COLL VENOUS BLD VENIPUNCTURE: CPT

## 2024-12-09 PROCEDURE — 84443 ASSAY THYROID STIM HORMONE: CPT

## 2025-02-11 ENCOUNTER — PATIENT MESSAGE (OUTPATIENT)
Dept: PRIMARY CARE | Facility: CLINIC | Age: 80
End: 2025-02-11
Payer: COMMERCIAL

## 2025-02-11 DIAGNOSIS — M25.552 LEFT HIP PAIN: Primary | ICD-10-CM

## 2025-02-12 ENCOUNTER — HOSPITAL ENCOUNTER (OUTPATIENT)
Dept: RADIOLOGY | Facility: HOSPITAL | Age: 80
Discharge: HOME | End: 2025-02-12
Payer: COMMERCIAL

## 2025-02-12 DIAGNOSIS — M25.552 LEFT HIP PAIN: ICD-10-CM

## 2025-02-12 PROCEDURE — 73502 X-RAY EXAM HIP UNI 2-3 VIEWS: CPT | Mod: LT

## 2025-02-17 ENCOUNTER — PATIENT MESSAGE (OUTPATIENT)
Dept: PRIMARY CARE | Facility: CLINIC | Age: 80
End: 2025-02-17
Payer: COMMERCIAL

## 2025-02-17 DIAGNOSIS — M25.552 LEFT HIP PAIN: Primary | ICD-10-CM

## 2025-03-07 ENCOUNTER — EVALUATION (OUTPATIENT)
Dept: PHYSICAL THERAPY | Facility: CLINIC | Age: 80
End: 2025-03-07
Payer: COMMERCIAL

## 2025-03-07 DIAGNOSIS — M25.552 LEFT HIP PAIN: ICD-10-CM

## 2025-03-07 PROCEDURE — 97530 THERAPEUTIC ACTIVITIES: CPT | Mod: GP | Performed by: PHYSICAL THERAPIST

## 2025-03-07 PROCEDURE — 97161 PT EVAL LOW COMPLEX 20 MIN: CPT | Mod: GP | Performed by: PHYSICAL THERAPIST

## 2025-03-07 SDOH — ECONOMIC STABILITY: GENERAL: QUALITY OF LIFE: GOOD

## 2025-03-07 ASSESSMENT — ENCOUNTER SYMPTOMS
PAIN SCALE AT LOWEST: 0
OCCASIONAL FEELINGS OF UNSTEADINESS: 0
DEPRESSION: 0
PAIN LOCATION: L HIP
LOSS OF SENSATION IN FEET: 0
QUALITY: DISCOMFORT
PAIN SCALE: 0
PAIN SCALE AT HIGHEST: 6
QUALITY: DULL ACHE

## 2025-03-07 NOTE — PROGRESS NOTES
Physical Therapy Evaluation and Treatment     Patient Name: Rajni Lala  MRN: [id number]  Encounter date: 3/7/2025  Time Calculation  Start Time: 932  Stop Time: 1010  Time Calculation (min): 38 min  PT Evaluation Time Entry  PT Evaluation (Low) Time Entry: 25  PT Therapeutic Procedures Time Entry  Therapeutic Activity Time Entry: 13  Low complexity due to patient's clinical presentation being stable and uncomplicated by any significant comorbidities that may affect rehab tolerance and progression.     Visit # 1 of 7  Visits/Dates Authorized: NO AUTH / $6000 OOP not met / $30 COPAY / 75V pt/ot/st - 0 used / ds 3/6/25.   Dry Needling - Not covered   Insurance Type: Payor: KEITH / Plan: [employer]M HMP / Product Type: *No Product type* /     Current Problem:   Problem List Items Addressed This Visit    None  Visit Diagnoses         Codes    Left hip pain     M25.552    Relevant Orders    Follow Up In Physical Therapy          Precautions:  Precautions  Precautions Comment: None       Subjective    Subjective Evaluation    History of Present Illness  Mechanism of injury: Pt presents to therapy with L hip pain. She reports she was doing exercise class last summer and ended up sublux her hip it did go back and was fine. She developed L plantar fascia and than getting hit in the lower L leg by car door which increased L hip pain. She is very active as she walks, participates in exercise class and bowl. She is unable to do all these without pain. She wants to remain very active as she has always been very active and even did down hill skiing until last year.   She does not report any other hip problems in the past or any current/past back issues other than stiffness during prolonged standing.     Quality of life: good    Pain  Current pain ratin  At best pain ratin  At worst pain ratin  Location: L hip  Quality: discomfort and dull ache    Social Support  Lives in: condominium (2 story without problems with  stairs; double hand rails)  Lives with: alone    Diagnostic Tests  X-ray: normal (Mild arthritis)    Treatments  No previous or current treatments  Patient Goals  Patient goals for therapy: decreased pain, increased strength, increased motion and return to sport/leisure activities  Patient goal: Return to bowling and exercise class without discomfort.         Objective      Objective     Palpation   Left   Tenderness of the adductor brevis, adductor longus, adductor aaron and TFL.     Right Tenderness of the iliopsoas.     Additional Palpation Details  IT band tenderness     Tenderness     Right Hip   Tenderness in the ASIS.     Active Range of Motion   Left Hip   Normal active range of motion    Strength/Myotome Testing     Left Hip   Planes of Motion   Flexion: 5  Abduction: 5  External rotation: 5  Internal rotation: 4+    Ambulation     Ambulation: Stairs   Ascend stairs: independent  Pattern: reciprocal  Railings: without rails  Descend stairs: independent  Pattern: reciprocal  Railings: without rails    Observational Gait   Gait: within functional limits     General Comments     Hip Comments   Pelvic alignment: abnormal with R shorter than L due to posterior R pelvic rotation corrected with METS and gentle AP mobs of R pelvis        Outcome Measures:  Other Measures  Lower Extremity Funtional Score (LEFS): 70     Treatments:  Therapeutic Activity  Therapeutic Activity Performed: Yes  Therapeutic Activity 1: Review and education of current HEP  Therapeutic Activity 2: Educated on anatomy in relation to findings  Therapeutic Activity 3: Trial of pelvic correction via manual therapy    HEP / Access Codes:   Access Code: [email or web address]  URL: [email or web address]  Date: 03/07/2025  Prepared by: Mary Bush    Exercises  - Supine SI Joint Self-Correction  - 1 x daily - 7 x weekly - 2 sets - 5 reps - 3-5 seconds hold  - Hooklying Isometric Hip Abduction Adduction with Belt and Ball  - 1 x daily - 7 x weekly -  2 sets - 5 reps - 3-5 seconds hold    Assessment   Assessment & Plan     Assessment  Impairments: abnormal gait, abnormal or restricted ROM, activity intolerance, impaired physical strength and pain with function  Assessment details: Pt is a 79 y.o female presenting to therapy with L hip pain. Pt demonstrated tenderness along IT band and adductors of L LE along with reduced strength of rotators due to IT band discomfort. Pt also found to have pelvic asymmetry with R posterior rotation creating strain on L LE and imbalance during weight bearing tasks. Overall pt demonstrated tension along L musculature along with pelvic asymmetry creating lumbopelvic imbalance. At this time pt would benefit from skilled physical therapy in order to prevent further functional decline and return to recreational task.    Prognosis: good    Goals  Return to bowling and exercise class without discomfort.    Plan  Therapy options: will be seen for skilled physical therapy services  Planned modality interventions: TENS, electrical stimulation/Russian stimulation and traction  Other planned modality interventions: KT Tape/Dry Needling/Cupping  Planned therapy interventions: abdominal trunk stabilization, manual therapy, motor coordination training, balance/weight-bearing training, neuromuscular re-education, body mechanics training, postural training, fine motor coordination training, soft tissue mobilization, flexibility, spinal/joint mobilization, functional ROM exercises, strengthening, gait training, stretching, therapeutic activities, home exercise program, transfer training and joint mobilization  Frequency: 1x week  Duration in visits: 6  Duration in weeks: 7  Treatment plan discussed with: patient           Goals:   Active       PT Problem       PT Goal 1       Start:  03/07/25    Expected End:  05/06/25       Pt will be 100% IND with HEP in 6 weeks in order to maintain progress with therapy.   Pt will improve B LE strength to 5/5 in  6 weeks in order to improve transfers, ambulation and stair negotiation   Pt will reduce pain levels to no more than 0/10 in 6 weeks in order to improve ambulation, squats and transfers  Pt will demonstrate subjective improvement of ADLs and recreational activities through improved score of 80 on LEFS in 6 weeks for personal goals.  Pt will return to walking, bowling and exercise class without any problems, pain or deficits in 6 weeks. - PERSONAL GOAL

## 2025-03-11 ENCOUNTER — OFFICE VISIT (OUTPATIENT)
Dept: PRIMARY CARE | Facility: CLINIC | Age: 80
End: 2025-03-11
Payer: COMMERCIAL

## 2025-03-11 VITALS
OXYGEN SATURATION: 97 % | HEIGHT: 65 IN | SYSTOLIC BLOOD PRESSURE: 130 MMHG | WEIGHT: 149 LBS | DIASTOLIC BLOOD PRESSURE: 80 MMHG | HEART RATE: 74 BPM | BODY MASS INDEX: 24.83 KG/M2 | TEMPERATURE: 96.2 F

## 2025-03-11 DIAGNOSIS — F41.9 ANXIETY: ICD-10-CM

## 2025-03-11 DIAGNOSIS — I10 PRIMARY HYPERTENSION: Primary | ICD-10-CM

## 2025-03-11 DIAGNOSIS — R03.0 ELEVATED BLOOD PRESSURE READING IN OFFICE WITHOUT DIAGNOSIS OF HYPERTENSION: ICD-10-CM

## 2025-03-11 PROCEDURE — 1036F TOBACCO NON-USER: CPT | Performed by: FAMILY MEDICINE

## 2025-03-11 PROCEDURE — 1126F AMNT PAIN NOTED NONE PRSNT: CPT | Performed by: FAMILY MEDICINE

## 2025-03-11 PROCEDURE — 1160F RVW MEDS BY RX/DR IN RCRD: CPT | Performed by: FAMILY MEDICINE

## 2025-03-11 PROCEDURE — 3075F SYST BP GE 130 - 139MM HG: CPT | Performed by: FAMILY MEDICINE

## 2025-03-11 PROCEDURE — 1158F ADVNC CARE PLAN TLK DOCD: CPT | Performed by: FAMILY MEDICINE

## 2025-03-11 PROCEDURE — 3079F DIAST BP 80-89 MM HG: CPT | Performed by: FAMILY MEDICINE

## 2025-03-11 PROCEDURE — 1159F MED LIST DOCD IN RCRD: CPT | Performed by: FAMILY MEDICINE

## 2025-03-11 PROCEDURE — 1123F ACP DISCUSS/DSCN MKR DOCD: CPT | Performed by: FAMILY MEDICINE

## 2025-03-11 PROCEDURE — 99213 OFFICE O/P EST LOW 20 MIN: CPT | Performed by: FAMILY MEDICINE

## 2025-03-11 RX ORDER — LISINOPRIL 10 MG/1
10 TABLET ORAL DAILY
Qty: 90 TABLET | Refills: 3 | Status: SHIPPED | OUTPATIENT
Start: 2025-03-11

## 2025-03-11 ASSESSMENT — PAIN SCALES - GENERAL: PAINLEVEL_OUTOF10: 0-NO PAIN

## 2025-03-11 NOTE — PROGRESS NOTES
Outpatient Visit Note    Chief Complaint   Patient presents with    Follow-up         HPI:  Rajni Lala is a 79 y.o. female with a past medical history significant for glaucoma/cataracts to which he is actively established in following with Dr. Delgadillo of Ophthalmology who presents to the office for follow-up.  She was last seen in the office in December 2024 for an annual Medicare Wellness Visit.    Last panel blood work completed on 12/9/2024 including CBC, CMP, lipid panel and TSH.  Blood work was generally unremarkable outside of moderate hyperlipidemia.    Overall, she describes her health as good with no reports of recent illness or hospitalization. No reported issues of chest pain, shortness of breath, headaches, vision/hearing changes, abdominal pain, vomiting, diarrhea, melena, hematochezia, constipation or urinary symptoms.    Has been compliant with lisinopril which has been generally tolerated outside of occasional nonproductive cough and slight nausea.  Did have more prominent congestion with right-sided ear fullness over the last 2 to 3 weeks.  Symptoms have calm down recently but she still notes a fullness on the right side.  Was experiencing sinus pressure which has resolved.     Hypertension:  She does check blood pressures at home, regularly seeing readings with systolics in the 120s-130s.  Feels generally comfortable on current regimen with interest on continuing without modification    Separately, she has had some tightness in her left thigh to which she is undergoing physical therapy assessment.  Does plan to be more active with the warmer weather.    Preventative Health Maintenance:  In regards to preventative health maintenance, last Tdap received unknown. Flu shot not typically received. Pneumonia vaccination series not previously initiated per state records and historically declined. Shingles vaccination series not pursued to date. COVID-19 vaccination series completed with  patient currently due for booster. Mammograms not traditionally pursued per patient's wishes. DEXA scan successfully completed in June 2023.    Advanced directives: Living will/POA    Current Medications  Current Outpatient Medications   Medication Instructions    ascorbic acid (VITAMIN C) 500 mg, Daily    B complex-vitamin C-folic acid (Nephro[facility]) 1- mg-mg-mcg tablet 1 tablet, Daily with breakfast    calcium carbonate/vitamin D3 (CALCIUM 500 + D, D3, ORAL) Take by mouth.    cyanocobalamin (VITAMIN B-12) 500 mcg, Daily    lisinopril 10 mg, oral, Daily        Allergies  Allergies   Allergen Reactions    Erythromycin Unknown    Penicillins Unknown    Tetracycline Unknown        Past Medical History:   Diagnosis Date    Cataract     Glaucoma       Past Surgical History:   Procedure Laterality Date    CATARACT EXTRACTION Left 06/2023    CHOLECYSTECTOMY       Family History   Problem Relation Name Age of Onset    Pancreatic cancer Mother      Heart disease Father Ric      Social History     Tobacco Use    Smoking status: Never     Passive exposure: Never    Smokeless tobacco: Never   Vaping Use    Vaping status: Never Used   Substance Use Topics    Alcohol use: Yes     Alcohol/week: 2.0 standard drinks of alcohol     Types: 1 Glasses of wine, 1 Shots of liquor per week    Drug use: Never       ROS  All pertinent positive symptoms are included in the history of present illness.  All other systems have been reviewed and are negative and noncontributory to this patient's current ailments.    VITAL SIGNS  Vitals:    03/11/25 1123   BP: 130/80   Pulse: 74   Temp: 35.7 °C (96.2 °F)   SpO2: 97%       PHYSICAL EXAM  GENERAL APPEARANCE: alert and oriented, Pleasant and cooperative, No Acute Distress  HEENT: EOMI, PERRLA, mildly bulging right TM with visible transudate, no appreciated erythema, MMM  HEART: RRR, normal S1S2, no murmurs, click or rubs  LUNGS: clear to auscultation bilaterally, no  wheezes/rhonchi/rales  EXTREMITIES: no edema, normal ROM  SKIN: normal, no rash, unremarkable  NEUROLOGIC EXAM: non-focal exam  MUSCULOSKELETAL: no gross abnormalities  PSYCH: affect is normal, eye contact is good    Assessment/Plan   Problem List Items Addressed This Visit             ICD-10-CM    Anxiety F41.9     - Stable         Hypertension - Primary I10     - Pressures have remained stable on lisinopril 10 mg to which we will continue without modification  -Continue to focus on healthy, balanced diet with moderation of salt/caffeine         Relevant Medications    lisinopril 10 mg tablet     Other Visit Diagnoses         Codes    Elevated blood pressure reading in office without diagnosis of hypertension     R03.0    Relevant Medications    lisinopril 10 mg tablet            Counseling:       Medication education:         Education:  The patient is counseled regarding potential side-effects of all new medications        Understanding:  Patient expressed understanding        Adherence:  No barriers to adherence identified    ** Please excuse any errors in grammar or translation related to this dictation. Voice recognition software was utilized to prepare this document. **

## 2025-03-11 NOTE — PATIENT INSTRUCTIONS
Problem List Items Addressed This Visit             ICD-10-CM    Anxiety F41.9     - Stable         Hypertension - Primary I10     - Pressures have remained stable on lisinopril 10 mg to which we will continue without modification  -Continue to focus on healthy, balanced diet with moderation of salt/caffeine         Relevant Medications    lisinopril 10 mg tablet     Other Visit Diagnoses         Codes    Elevated blood pressure reading in office without diagnosis of hypertension     R03.0    Relevant Medications    lisinopril 10 mg tablet            Counseling:       Medication education:         Education:  The patient is counseled regarding potential side-effects of all new medications        Understanding:  Patient expressed understanding        Adherence:  No barriers to adherence identified    ** Please excuse any errors in grammar or translation related to this dictation. Voice recognition software was utilized to prepare this document. **

## 2025-03-13 ENCOUNTER — TREATMENT (OUTPATIENT)
Dept: PHYSICAL THERAPY | Facility: CLINIC | Age: 80
End: 2025-03-13
Payer: COMMERCIAL

## 2025-03-13 DIAGNOSIS — M25.552 LEFT HIP PAIN: ICD-10-CM

## 2025-03-13 PROCEDURE — 97110 THERAPEUTIC EXERCISES: CPT | Mod: GP,CQ

## 2025-03-13 PROCEDURE — 97140 MANUAL THERAPY 1/> REGIONS: CPT | Mod: GP,CQ

## 2025-03-13 NOTE — PROGRESS NOTES
Physical Therapy Treatment    Patient Name: Rajni Lala  MRN: [id number]  Today's Date: 3/13/2025  Time Calculation  Start Time: 0845  Stop Time: 0925  Time Calculation (min): 40 min  PT Therapeutic Procedures Time Entry  Manual Therapy Time Entry: 20  Therapeutic Exercise Time Entry: 20    Insurance:  Visit number: 2 of 7  Authorization info: 2025:  ANTH FED R - NO AUTH / $6000 OOP not met / $30 COPAY / 75V pt/ot/st - 0 used / ds 3/6/25.   Insurance Type: Payor: KEITH / Plan: [employer]M HMP / Product Type: *No Product type* /     Current Problem   1. Left hip pain  Follow Up In Physical Therapy          Subjective   General    Pt still has L lateral hip pain that prevents her from bowling and doing things she is accustomed to.  Precautions:   none  Pain    minial  Post Treatment Pain Level less    Objective   Tenderness along L proximal ITB  Pelvic assymetry ( R anterior rotation)  Treatments:  Therapeutic Exercise:   Nu-step x 5'     PPT   PPT with MIP   Bridges 2 x 10  Clam shells with L3 TB 2 x 10   Piriformis stretch in supine  Manual:   Rolling out of L ITB/glute  Cupping along L proximal ITB  MET to correct roytation    Assessment   Assessment:    Pt tolerated session fairly well. Tension alonf L lateral hip .    Plan:    Continue with POC    OP EDUCATION:   Updated HEP    Goals:   Active       PT Problem       PT Goal 1       Start:  03/07/25    Expected End:  05/06/25       Pt will be 100% IND with HEP in 6 weeks in order to maintain progress with therapy.   Pt will improve B LE strength to 5/5 in 6 weeks in order to improve transfers, ambulation and stair negotiation   Pt will reduce pain levels to no more than 0/10 in 6 weeks in order to improve ambulation, squats and transfers  Pt will demonstrate subjective improvement of ADLs and recreational activities through improved score of 80 on LEFS in 6 weeks for personal goals.  Pt will return to walking, bowling and exercise class without any problems, pain  or deficits in 6 weeks. - PERSONAL GOAL

## 2025-03-18 ENCOUNTER — TREATMENT (OUTPATIENT)
Dept: PHYSICAL THERAPY | Facility: CLINIC | Age: 80
End: 2025-03-18
Payer: COMMERCIAL

## 2025-03-18 DIAGNOSIS — M25.552 LEFT HIP PAIN: ICD-10-CM

## 2025-03-18 PROCEDURE — 97110 THERAPEUTIC EXERCISES: CPT | Mod: GP,CQ

## 2025-03-18 PROCEDURE — 97140 MANUAL THERAPY 1/> REGIONS: CPT | Mod: GP,CQ

## 2025-03-18 NOTE — PROGRESS NOTES
Physical Therapy Treatment    Patient Name: Rajni Lala  MRN: [id number]  Today's Date: 3/18/2025  Time Calculation  Start Time: 0840  Stop Time: 0920  Time Calculation (min): 40 min  PT Therapeutic Procedures Time Entry  Manual Therapy Time Entry: 10  Therapeutic Exercise Time Entry: 30    Insurance:  Visit number: 3 of 7  Authorization info: 2025:  ANTH FED R - NO AUTH / $6000 OOP not met / $30 COPAY / 75V pt/ot/st - 0 used / ds 3/6/25.   Dry Needling - Not covered //  Insurance Type: Payor: KEITH / Plan: [employer]M HMP / Product Type: *No Product type* /     Current Problem   1. Left hip pain  Follow Up In Physical Therapy          Subjective   General    Pt was sore after last session but pain went away. Went bowling last night  Precautions:     Pain    4  Post Treatment Pain Level 2    Objective   Tenderness along lateral upper thigh more posteriorly    Treatments:  Therapeutic Exercise:  Therapeutic Exercise:   Nu-step x 5'   STS 1 pad 2 x 15  Mini lunge with rotations (L)  Proximal knee    PPT   PPT with MIP  Bridges 2 x 10 with L3  band  Clam shells with L3 TB 2 x 10  Hip flexor stretch  Supine leg crossover stretch  Piriformis stretch in supine      Manual:   Rolling out of L ITB/glute  STM to L lateral /posterior thigh      Assessment   Assessment:    Pt had no pain along distal ITB like last session. Mild discomfort in proximal posteriolateral thigh.    Plan:    Continue with POC    OP EDUCATION:   Added mini lunges with rotation L    Goals:   Active       PT Problem       PT Goal 1       Start:  03/07/25    Expected End:  05/06/25       Pt will be 100% IND with HEP in 6 weeks in order to maintain progress with therapy.   Pt will improve B LE strength to 5/5 in 6 weeks in order to improve transfers, ambulation and stair negotiation   Pt will reduce pain levels to no more than 0/10 in 6 weeks in order to improve ambulation, squats and transfers  Pt will demonstrate subjective improvement of ADLs and  recreational activities through improved score of 80 on LEFS in 6 weeks for personal goals.  Pt will return to walking, bowling and exercise class without any problems, pain or deficits in 6 weeks. - PERSONAL GOAL

## 2025-03-25 ENCOUNTER — TREATMENT (OUTPATIENT)
Dept: PHYSICAL THERAPY | Facility: CLINIC | Age: 80
End: 2025-03-25
Payer: COMMERCIAL

## 2025-03-25 DIAGNOSIS — M25.552 LEFT HIP PAIN: ICD-10-CM

## 2025-03-25 PROCEDURE — 97110 THERAPEUTIC EXERCISES: CPT | Mod: CQ,GP

## 2025-03-25 NOTE — PROGRESS NOTES
Physical Therapy Treatment    Patient Name: Rajni Lala  MRN: [id number]  Today's Date: 3/25/2025  Time Calculation  Start Time: 0925  Stop Time: 1005  Time Calculation (min): 40 min  PT Therapeutic Procedures Time Entry  Therapeutic Exercise Time Entry: 40    Insurance:  Visit number: 4 of 7  Authorization info: 2025:  ANTH FED R - NO AUTH / $6000 OOP not met / $30 COPAY / 75V pt/ot/st - 0 used / ds 3/6/25.   Dry Needling - Not covered //  Insurance Type: Payor: KEITH / Plan: [employer] HMP / Product Type: *No Product type* /     Current Problem   1. Left hip pain  Follow Up In Physical Therapy          Subjective   General    Pt feels she is improving. Felt better bowling the other night.  Precautions:   None   Pain    1-2  Post Treatment Pain Level 1-2    Objective   Tenderness along L piriformis    Treatments:  Therapeutic Exercise:    Nu-step x 5'   STS 2 x 15  Mini lunge with rotations (L) onto BOSU x 10  Mini squats on BOSU x 10  Proximal knee with PTB x 15  Towel slides x 12  Mule kicks 2 x 10  PPT with MIP  Bridges 2 x 10 with L3  band  Clam shells with L3 TB 2 x 10  Hip flexor stretch  Supine leg crossover stretch  Piriformis stretch in supine      Assessment   Assessment:    Focused on proximal strengthening to reinforce pelvic control with functional tasks.    Plan:    Continue with POC    OP EDUCATION:   Added mule kicks and towel slides to HEP    Goals:   Active       PT Problem       PT Goal 1       Start:  03/07/25    Expected End:  05/06/25       Pt will be 100% IND with HEP in 6 weeks in order to maintain progress with therapy.   Pt will improve B LE strength to 5/5 in 6 weeks in order to improve transfers, ambulation and stair negotiation   Pt will reduce pain levels to no more than 0/10 in 6 weeks in order to improve ambulation, squats and transfers  Pt will demonstrate subjective improvement of ADLs and recreational activities through improved score of 80 on LEFS in 6 weeks for personal  goals.  Pt will return to walking, bowling and exercise class without any problems, pain or deficits in 6 weeks. - PERSONAL GOAL

## 2025-04-02 ENCOUNTER — APPOINTMENT (OUTPATIENT)
Dept: PHYSICAL THERAPY | Facility: CLINIC | Age: 80
End: 2025-04-02
Payer: COMMERCIAL

## 2025-04-02 DIAGNOSIS — M25.552 LEFT HIP PAIN: ICD-10-CM

## 2025-04-02 PROCEDURE — 97110 THERAPEUTIC EXERCISES: CPT | Mod: GP,CQ

## 2025-04-02 NOTE — PROGRESS NOTES
"Physical Therapy Treatment    Patient Name: Rajni Lala  MRN: [id number]  Today's Date: 4/2/2025  Time Calculation  Start Time: 0845  Stop Time: 0925  Time Calculation (min): 40 min  PT Therapeutic Procedures Time Entry  Manual Therapy Time Entry: 5  Therapeutic Exercise Time Entry: 35    Insurance:  Visit number: 5 of 7  Authorization info: 2025:  ANTH FED R - NO AUTH / $6000 OOP not met / $30 COPAY / 75V pt/ot/st - 0 used / ds 3/6/25.   Dry Needling - Not covered //  Insurance Type: Payor: KEITH / Plan: [employer]M HMP / Product Type: *No Product type* /     Current Problem   1. Left hip pain  Follow Up In Physical Therapy          Subjective   General    Pt still feels the inability to trust L LE with her bowling motion.  Precautions:   None  Pain    0  Post Treatment Pain Level 0    Objective   Tightness in L glute/piriformis    Treatments:  Therapeutic Exercise:   Bike x 3'   STS 2 x 15 with #5 DB  Split squat lunges 2 x 10  Lateral pushes into RSB with L LE x 10 with 5\" holds  SLS on tramp  3 x 20\"  PPT with MIP  Bridges 2 x 10 with L3  band  SL bridges on L 2 x 10  SLS on L on tramp 3 c 20\"  Step and holds onto tramp 2 x 10   Supine gluteal and piriformis strertch    Manual:   Piriformis release      DNP  Mini lunge with rotations (L) onto BOSU x 10  Mini squats on BOSU x 10  Proximal knee with PTB x 15  Towel slides x 12  Mule kicks 2 x 10        Assessment   Assessment:    Pt showed fair balance with SLS tasks    Plan:    Continue with POC    OP EDUCATION:   Added SL bridging  and gluteal stretching.    Goals:   Active       PT Problem       PT Goal 1       Start:  03/07/25    Expected End:  05/06/25       Pt will be 100% IND with HEP in 6 weeks in order to maintain progress with therapy.   Pt will improve B LE strength to 5/5 in 6 weeks in order to improve transfers, ambulation and stair negotiation   Pt will reduce pain levels to no more than 0/10 in 6 weeks in order to improve ambulation, squats and " transfers  Pt will demonstrate subjective improvement of ADLs and recreational activities through improved score of 80 on LEFS in 6 weeks for personal goals.  Pt will return to walking, bowling and exercise class without any problems, pain or deficits in 6 weeks. - PERSONAL GOAL

## 2025-04-09 ENCOUNTER — TREATMENT (OUTPATIENT)
Dept: PHYSICAL THERAPY | Facility: CLINIC | Age: 80
End: 2025-04-09
Payer: COMMERCIAL

## 2025-04-09 DIAGNOSIS — M25.552 LEFT HIP PAIN: ICD-10-CM

## 2025-04-09 PROCEDURE — 97110 THERAPEUTIC EXERCISES: CPT | Mod: GP,CQ

## 2025-04-09 NOTE — PROGRESS NOTES
"Physical Therapy Treatment    Patient Name: Rajni Lala  MRN: [id number]  Today's Date: 4/9/2025  Time Calculation  Start Time: 0925  Stop Time: 1005  Time Calculation (min): 40 min  PT Therapeutic Procedures Time Entry  Therapeutic Exercise Time Entry: 40    Insurance:  Visit number: 6 of 7  Authorization info: 2025:  ANTH FED R - NO AUTH / $6000 OOP not met / $30 COPAY / 75V pt/ot/st - 0 used / ds 3/6/25.   Dry Needling - Not covered //  Insurance Type: Payor: KEITH / Plan: [employer] HMP / Product Type: *No Product type* /     Current Problem   1. Left hip pain  Follow Up In Physical Therapy          Subjective   General    Pt reports that she is doing better. No issues with bowling last night.  Precautions:   None  Pain    0  Post Treatment Pain Level 0    Objective   Minimal L piriformis tightness with there ex.    Treatments:  Therapeutic Exercise:   Bike x 3'  STS 2 x 15 with #8 DB  Lunges  onto BOSU 2 x 10  Lateral pushes into RSB with L LE x 10 with 5\" holds  Mini squats on BOSU x 10  SLS on tramp  3 x 20\"  Step and holds onto tramp 2 x 10  PPT with MIP with L3 band 2 x 15  Bridges 2 x 10 with L3  band 2 x 15  SL bridges on L 2 x 10  S/L Hip ABD with bent knee x 10   Clam shells with L3 TB  2 x 10  Step and holds onto tramp 2 x 10  Supine gluteal and piriformis stretch      Assessment   Assessment:    Pt is progressing well. Able to withstand ADL's and bowling without issue.    Plan:    Reassess next session    OP EDUCATION:   Increased resistances with there ex.    Goals:   Active       PT Problem       PT Goal 1 (Progressing)       Start:  03/07/25    Expected End:  05/06/25       Pt will be 100% IND with HEP in 6 weeks in order to maintain progress with therapy.   Pt will improve B LE strength to 5/5 in 6 weeks in order to improve transfers, ambulation and stair negotiation   Pt will reduce pain levels to no more than 0/10 in 6 weeks in order to improve ambulation, squats and transfers  Pt will " demonstrate subjective improvement of ADLs and recreational activities through improved score of 80 on LEFS in 6 weeks for personal goals.  Pt will return to walking, bowling and exercise class without any problems, pain or deficits in 6 weeks. - PERSONAL GOAL

## 2025-04-16 ENCOUNTER — TREATMENT (OUTPATIENT)
Dept: PHYSICAL THERAPY | Facility: CLINIC | Age: 80
End: 2025-04-16
Payer: COMMERCIAL

## 2025-04-16 DIAGNOSIS — M25.552 LEFT HIP PAIN: ICD-10-CM

## 2025-04-16 PROCEDURE — 97530 THERAPEUTIC ACTIVITIES: CPT | Mod: GP | Performed by: PHYSICAL THERAPIST

## 2025-04-16 NOTE — PROGRESS NOTES
"Physical Therapy Progress Note/Treatment    Patient Name: Rajni Lala  MRN: [id number]  Today's Date: 4/16/2025  Time Calculation  Start Time: 0930  Stop Time: 1011  Time Calculation (min): 41 min  PT Therapeutic Procedures Time Entry  Therapeutic Activity Time Entry: 40    Insurance:  Visit number: 7 of 13  Authorization info: 2025:  ANTH FED R - NO AUTH / $6000 OOP not met / $30 COPAY / 75V pt/ot/st - 0 used / ds 3/6/25.   Dry Needling - Not covered //  Insurance Type: Payor: KEITH / Plan: [employer]M HMP / Product Type: *No Product type* /     Current Problem   1. Left hip pain  Follow Up In Physical Therapy          Subjective   General    Pt reports that she is doing better, she is able to do standard lunges but is unable to perform dynamic lunges. She has no problems with any daily activities. She is compliant with HEP. Most of the discomfort is along L proximal IT band/TFL. She is still participating in exercise class with everything being fine except SLR, donkey kicks and hip abd in knee bent position. Pt would like to continue with therapy.   Precautions:   None  Pain    0  Post Treatment Pain Level 0    Objective   Lunges WNL   SLS WNL slight drop during L compared to R     L LE MMT:  hip flexion 4+/5 noted reduction in core noted anterior pelvic tilt   Hip abduction 4+/5 noted compensation with trunk roll and hip flexor compensation     Leg length symmetrical  Noted slight anterior rotation of L compared to R       Treatments:  Therapeutic Assessment:   NuStep Warm Up.   Reassessment performed this date   Review of HEP      DNP   Bike x 3'  STS 2 x 15 with #8 DB  Lunges  onto BOSU 2 x 10  Lateral pushes into RSB with L LE x 10 with 5\" holds  Mini squats on BOSU x 10  SLS on tramp  3 x 20\"  Step and holds onto tramp 2 x 10  PPT with MIP with L3 band 2 x 15  Bridges 2 x 10 with L3  band 2 x 15  SL bridges on L 2 x 10  S/L Hip ABD with bent knee x 10   Clam shells with L3 TB  2 x 10  Step and holds onto " tramp 2 x 10  Supine gluteal and piriformis stretch      Assessment   Assessment:    Pt has progressed since the start of therapy able to perform all ADLs with minimal to no discomfort however higher level recreational tasks remain challenging with symptoms. Pt demonstrating continued L hip abd weakness and overall fatigue with compensation and reduced core strength noted with pelvic involvement. At this time pt and PT agree to extend POC for another 6 visits at once a week to focus on dynamic core and hip strengthening tasks.     Plan:    1x/week for 6 visits     OP EDUCATION:   Access Code [email or web address]     Goals:   Active       PT Problem       PT Goal 1 (Progressing)       Start:  03/07/25    Expected End:  05/06/25       Pt will be 100% IND with HEP in 6 weeks in order to maintain progress with therapy.   Pt will improve B LE strength to 5/5 in 6 weeks in order to improve transfers, ambulation and stair negotiation   Pt will reduce pain levels to no more than 0/10 in 6 weeks in order to improve ambulation, squats and transfers  Pt will demonstrate subjective improvement of ADLs and recreational activities through improved score of 80 on LEFS in 6 weeks for personal goals.  Pt will return to walking, bowling and exercise class without any problems, pain or deficits in 6 weeks. - PERSONAL GOAL

## 2025-04-22 ENCOUNTER — APPOINTMENT (OUTPATIENT)
Dept: OPHTHALMOLOGY | Facility: CLINIC | Age: 80
End: 2025-04-22
Payer: COMMERCIAL

## 2025-05-06 ENCOUNTER — TREATMENT (OUTPATIENT)
Dept: PHYSICAL THERAPY | Facility: CLINIC | Age: 80
End: 2025-05-06
Payer: COMMERCIAL

## 2025-05-06 DIAGNOSIS — M25.552 LEFT HIP PAIN: ICD-10-CM

## 2025-05-06 PROCEDURE — 97110 THERAPEUTIC EXERCISES: CPT | Mod: GP | Performed by: PHYSICAL THERAPIST

## 2025-05-06 NOTE — PROGRESS NOTES
Physical Therapy Discharge/Treatment    Patient Name: Rajni Lala  MRN: [id number]  Today's Date: 5/6/2025  Time Calculation  Start Time: 0815  Stop Time: 0845  Time Calculation (min): 30 min  PT Therapeutic Procedures Time Entry  Therapeutic Exercise Time Entry: 30    Insurance:  Visit number: 8 of 13  Authorization info: 2025:  ANTH FED R - NO AUTH / $6000 OOP not met / $30 COPAY / 75V pt/ot/st - 0 used / ds 3/6/25.   Dry Needling - Not covered //  Insurance Type: Payor: KEITH / Plan: [employer] HMP / Product Type: *No Product type* /     Current Problem   1. Left hip pain  Follow Up In Physical Therapy            Subjective   General    Pt reports she is doing really well and feels ready to be finished with physical therapy. She is back to bowling without any deficits.   Precautions:   None  Pain    0  Post Treatment Pain Level 0    Objective   Good control   Full ROM and strength     Treatments:  Therapeutic Exercise   NuStep Warm Up  Reverse BOSU squat 10x2  BOSU lunge 10x2 B fwd/lateral   Lateral steps @ squat position mint TB 3 laps   Standing firehydrant mint TB 10x3 B   SLR 10x3 B       Assessment   Assessment:    Pt entered therapy this date stating that she is ready to be done with therapy, PT in agreement, formal discharge at this time.     Plan:    Discharge     OP EDUCATION:   Access Code [email or web address]     Goals:   Resolved       PT Problem       PT Goal 1 (Met)       Start:  03/07/25    Expected End:  05/06/25    Resolved:  05/06/25    Pt will be 100% IND with HEP in 6 weeks in order to maintain progress with therapy.   Pt will improve B LE strength to 5/5 in 6 weeks in order to improve transfers, ambulation and stair negotiation   Pt will reduce pain levels to no more than 0/10 in 6 weeks in order to improve ambulation, squats and transfers  Pt will demonstrate subjective improvement of ADLs and recreational activities through improved score of 80 on LEFS in 6 weeks for personal goals.  Pt will return  to walking, bowling and exercise class without any problems, pain or deficits in 6 weeks. - PERSONAL GOAL

## 2025-05-14 ENCOUNTER — APPOINTMENT (OUTPATIENT)
Dept: PHYSICAL THERAPY | Facility: CLINIC | Age: 80
End: 2025-05-14
Payer: COMMERCIAL

## 2025-05-21 ENCOUNTER — APPOINTMENT (OUTPATIENT)
Dept: PHYSICAL THERAPY | Facility: CLINIC | Age: 80
End: 2025-05-21
Payer: COMMERCIAL

## 2025-05-28 ENCOUNTER — APPOINTMENT (OUTPATIENT)
Dept: PHYSICAL THERAPY | Facility: CLINIC | Age: 80
End: 2025-05-28
Payer: COMMERCIAL

## 2025-06-04 ENCOUNTER — APPOINTMENT (OUTPATIENT)
Dept: PHYSICAL THERAPY | Facility: CLINIC | Age: 80
End: 2025-06-04
Payer: COMMERCIAL

## 2025-06-11 ENCOUNTER — APPOINTMENT (OUTPATIENT)
Dept: PHYSICAL THERAPY | Facility: CLINIC | Age: 80
End: 2025-06-11
Payer: COMMERCIAL

## 2025-08-20 ENCOUNTER — OFFICE VISIT (OUTPATIENT)
Dept: PRIMARY CARE | Facility: CLINIC | Age: 80
End: 2025-08-20
Payer: COMMERCIAL

## 2025-08-20 VITALS
TEMPERATURE: 97.6 F | DIASTOLIC BLOOD PRESSURE: 80 MMHG | BODY MASS INDEX: 24.32 KG/M2 | HEART RATE: 100 BPM | HEIGHT: 65 IN | SYSTOLIC BLOOD PRESSURE: 124 MMHG | WEIGHT: 146 LBS | OXYGEN SATURATION: 98 %

## 2025-08-20 DIAGNOSIS — Z78.0 MENOPAUSE: ICD-10-CM

## 2025-08-20 DIAGNOSIS — Z11.59 NEED FOR HEPATITIS C SCREENING TEST: ICD-10-CM

## 2025-08-20 DIAGNOSIS — I10 PRIMARY HYPERTENSION: Primary | ICD-10-CM

## 2025-08-20 DIAGNOSIS — Z76.89 ENCOUNTER TO ESTABLISH CARE: ICD-10-CM

## 2025-08-20 PROCEDURE — 99213 OFFICE O/P EST LOW 20 MIN: CPT | Performed by: PHYSICIAN ASSISTANT

## 2025-08-20 PROCEDURE — 1126F AMNT PAIN NOTED NONE PRSNT: CPT | Performed by: PHYSICIAN ASSISTANT

## 2025-08-20 PROCEDURE — 1159F MED LIST DOCD IN RCRD: CPT | Performed by: PHYSICIAN ASSISTANT

## 2025-08-20 PROCEDURE — 3074F SYST BP LT 130 MM HG: CPT | Performed by: PHYSICIAN ASSISTANT

## 2025-08-20 PROCEDURE — 1036F TOBACCO NON-USER: CPT | Performed by: PHYSICIAN ASSISTANT

## 2025-08-20 PROCEDURE — 3079F DIAST BP 80-89 MM HG: CPT | Performed by: PHYSICIAN ASSISTANT

## 2025-08-20 ASSESSMENT — PAIN SCALES - GENERAL: PAINLEVEL_OUTOF10: 0-NO PAIN

## 2025-08-20 ASSESSMENT — ENCOUNTER SYMPTOMS
ALLERGIC/IMMUNOLOGIC NEGATIVE: 1
CARDIOVASCULAR NEGATIVE: 1
CONSTITUTIONAL NEGATIVE: 1
GASTROINTESTINAL NEGATIVE: 1
LOSS OF SENSATION IN FEET: 0
NEUROLOGICAL NEGATIVE: 1
EYES NEGATIVE: 1
DEPRESSION: 0
ENDOCRINE NEGATIVE: 1
HEMATOLOGIC/LYMPHATIC NEGATIVE: 1
PSYCHIATRIC NEGATIVE: 1
RESPIRATORY NEGATIVE: 1
MUSCULOSKELETAL NEGATIVE: 1
OCCASIONAL FEELINGS OF UNSTEADINESS: 0

## 2025-08-20 ASSESSMENT — COLUMBIA-SUICIDE SEVERITY RATING SCALE - C-SSRS
2. HAVE YOU ACTUALLY HAD ANY THOUGHTS OF KILLING YOURSELF?: NO
6. HAVE YOU EVER DONE ANYTHING, STARTED TO DO ANYTHING, OR PREPARED TO DO ANYTHING TO END YOUR LIFE?: NO
1. IN THE PAST MONTH, HAVE YOU WISHED YOU WERE DEAD OR WISHED YOU COULD GO TO SLEEP AND NOT WAKE UP?: NO

## 2025-08-23 LAB
ALBUMIN SERPL-MCNC: 4.2 G/DL (ref 3.6–5.1)
ALP SERPL-CCNC: 54 U/L (ref 37–153)
ALT SERPL-CCNC: 10 U/L (ref 6–29)
ANION GAP SERPL CALCULATED.4IONS-SCNC: 7 MMOL/L (CALC) (ref 7–17)
AST SERPL-CCNC: 17 U/L (ref 10–35)
BILIRUB SERPL-MCNC: 1.3 MG/DL (ref 0.2–1.2)
BUN SERPL-MCNC: 15 MG/DL (ref 7–25)
CALCIUM SERPL-MCNC: 9.2 MG/DL (ref 8.6–10.4)
CHLORIDE SERPL-SCNC: 105 MMOL/L (ref 98–110)
CHOLEST SERPL-MCNC: 210 MG/DL
CHOLEST/HDLC SERPL: 3.2 (CALC)
CO2 SERPL-SCNC: 28 MMOL/L (ref 20–32)
CREAT SERPL-MCNC: 0.97 MG/DL (ref 0.6–1)
EGFRCR SERPLBLD CKD-EPI 2021: 59 ML/MIN/1.73M2
GLUCOSE SERPL-MCNC: 91 MG/DL (ref 65–99)
HCV AB SERPL QL IA: NORMAL
HDLC SERPL-MCNC: 66 MG/DL
LDLC SERPL CALC-MCNC: 126 MG/DL (CALC)
NONHDLC SERPL-MCNC: 144 MG/DL (CALC)
POTASSIUM SERPL-SCNC: 4.6 MMOL/L (ref 3.5–5.3)
PROT SERPL-MCNC: 6.6 G/DL (ref 6.1–8.1)
SODIUM SERPL-SCNC: 140 MMOL/L (ref 135–146)
TRIGL SERPL-MCNC: 83 MG/DL
TSH SERPL-ACNC: 1.57 MIU/L (ref 0.4–4.5)